# Patient Record
Sex: MALE | Race: WHITE
[De-identification: names, ages, dates, MRNs, and addresses within clinical notes are randomized per-mention and may not be internally consistent; named-entity substitution may affect disease eponyms.]

---

## 2017-08-22 ENCOUNTER — HOSPITAL ENCOUNTER (EMERGENCY)
Dept: HOSPITAL 26 - MED | Age: 47
Discharge: HOME | End: 2017-08-22
Payer: COMMERCIAL

## 2017-08-22 VITALS — DIASTOLIC BLOOD PRESSURE: 79 MMHG | SYSTOLIC BLOOD PRESSURE: 98 MMHG

## 2017-08-22 VITALS — BODY MASS INDEX: 25.71 KG/M2 | WEIGHT: 160 LBS | HEIGHT: 66 IN

## 2017-08-22 VITALS — DIASTOLIC BLOOD PRESSURE: 77 MMHG | SYSTOLIC BLOOD PRESSURE: 129 MMHG

## 2017-08-22 DIAGNOSIS — S20.219A: Primary | ICD-10-CM
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
## 2017-08-22 LAB
ALBUMIN FLD-MCNC: 3.7 G/DL (ref 3.4–5)
ANION GAP SERPL CALCULATED.3IONS-SCNC: 11.7 MMOL/L (ref 8–16)
AST SERPL-CCNC: 18 U/L (ref 15–37)
BASOPHILS # BLD AUTO: 0.6 K/UL (ref 0–0.22)
BASOPHILS NFR BLD AUTO: 0 % (ref 0–2)
BILIRUB SERPL-MCNC: 0.5 MG/DL (ref 0–1)
BUN SERPL-MCNC: 9 MG/DL (ref 7–18)
CHLORIDE SERPL-SCNC: 97 MMOL/L (ref 98–107)
CO2 SERPL-SCNC: 28.1 MMOL/L (ref 21–32)
CREAT SERPL-MCNC: 0.9 MG/DL (ref 0.7–1.3)
EOSINOPHIL # BLD AUTO: 0.1 K/UL (ref 0–0.4)
EOSINOPHIL NFR BLD AUTO: 1 % (ref 0–4)
ERYTHROCYTE [DISTWIDTH] IN BLOOD BY AUTOMATED COUNT: 12.1 % (ref 11.6–13.7)
GFR SERPL CREATININE-BSD FRML MDRD: 116 ML/MIN (ref 90–?)
GLUCOSE SERPL-MCNC: 94 MG/DL (ref 74–106)
HCT VFR BLD AUTO: 39.2 % (ref 36–52)
HGB BLD-MCNC: 13.5 G/DL (ref 12–18)
LYMPHOCYTES # BLD AUTO: 2.4 K/UL (ref 2–11.5)
LYMPHOCYTES NFR BLD AUTO: 31 % (ref 20.5–51.1)
MCH RBC QN AUTO: 34 PG (ref 27–31)
MCHC RBC AUTO-ENTMCNC: 35 G/DL (ref 33–37)
MCV RBC AUTO: 99 FL (ref 80–94)
MONOCYTES # BLD AUTO: 0.5 K/UL (ref 0.8–1)
MONOCYTES NFR BLD AUTO: 9 % (ref 1.7–9.3)
NEUTROPHILS # BLD AUTO: 2.9 K/UL (ref 1.8–7.7)
NEUTROPHILS NFR BLD AUTO: 59 % (ref 42.2–75.2)
PLATELET # BLD AUTO: 98 K/UL (ref 140–450)
POTASSIUM SERPL-SCNC: 3.8 MMOL/L (ref 3.5–5.1)
PROTHROMBIN TIME: 13.3 SECS (ref 10.8–13.4)
RBC # BLD AUTO: 3.98 MIL/UL (ref 4.2–6.1)
SODIUM SERPL-SCNC: 133 MMOL/L (ref 136–145)
WBC # BLD AUTO: 6.5 K/UL (ref 4.8–10.8)

## 2017-08-22 PROCEDURE — 85610 PROTHROMBIN TIME: CPT

## 2017-08-22 PROCEDURE — 93005 ELECTROCARDIOGRAM TRACING: CPT

## 2017-08-22 PROCEDURE — 84484 ASSAY OF TROPONIN QUANT: CPT

## 2017-08-22 PROCEDURE — 85025 COMPLETE CBC W/AUTO DIFF WBC: CPT

## 2017-08-22 PROCEDURE — 80053 COMPREHEN METABOLIC PANEL: CPT

## 2017-08-22 PROCEDURE — 96361 HYDRATE IV INFUSION ADD-ON: CPT

## 2017-08-22 PROCEDURE — 36415 COLL VENOUS BLD VENIPUNCTURE: CPT

## 2017-08-22 PROCEDURE — 83880 ASSAY OF NATRIURETIC PEPTIDE: CPT

## 2017-08-22 PROCEDURE — 99285 EMERGENCY DEPT VISIT HI MDM: CPT

## 2017-08-22 PROCEDURE — 85730 THROMBOPLASTIN TIME PARTIAL: CPT

## 2017-08-22 PROCEDURE — 96360 HYDRATION IV INFUSION INIT: CPT

## 2017-08-22 PROCEDURE — 71010: CPT

## 2017-08-22 NOTE — NUR
Patient discharged with v/s stable. Written and verbal after care instructions 
given and explained. 

Patient alert, oriented and verbalized understanding of instructions. Wheel 
Chair Assisted with by caregiver. All questions addressed prior to discharge. 
ID band removed. Patient advised to follow up with PMD. Rx of IBUPROFEN given. 
Patient educated on indication of medication including possible reaction and 
side effects. Opportunity to ask questions provided and answered.

## 2017-08-22 NOTE — NUR
PATIENT PRESENTS TO ED WITH CHEST PAIN AND HEADACHE TODAY. PT IS FROM [facility] 
[facility], CAREGIVERS PRESENT AT BEDSIDE. DENIES N/V/D; SKIN IS PINK/WARM/DRY; AAOX4 
WITH EVEN AND STEADY GAIT; LUNGS CLEAR BL; HR EVEN AND REGULAR; PT DENIES ANY 
FEVER, CP, SOB, OR COUGH AT THIS TIME; VSS; PATIENT POSITIONED FOR COMFORT; HOB 
ELEVATED; BEDRAILS UP X2; BED DOWN. ER MD MADE AWARE OF PT STATUS.

## 2020-09-01 ENCOUNTER — HOSPITAL ENCOUNTER (EMERGENCY)
Dept: HOSPITAL 26 - MED | Age: 50
Discharge: HOME | End: 2020-09-01
Payer: COMMERCIAL

## 2020-09-01 VITALS — HEIGHT: 66 IN | WEIGHT: 150 LBS | BODY MASS INDEX: 24.11 KG/M2

## 2020-09-01 VITALS — DIASTOLIC BLOOD PRESSURE: 58 MMHG | SYSTOLIC BLOOD PRESSURE: 126 MMHG

## 2020-09-01 VITALS — SYSTOLIC BLOOD PRESSURE: 115 MMHG | DIASTOLIC BLOOD PRESSURE: 65 MMHG

## 2020-09-01 DIAGNOSIS — Z79.899: ICD-10-CM
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
## 2020-09-01 DIAGNOSIS — Z86.73: ICD-10-CM

## 2020-09-01 DIAGNOSIS — S52.501A: Primary | ICD-10-CM
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
## 2020-09-01 DIAGNOSIS — S52.601A: ICD-10-CM
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
## 2020-09-01 PROCEDURE — 25605 CLTX DST RDL FX/EPHYS SEP W/: CPT

## 2020-09-01 PROCEDURE — 99285 EMERGENCY DEPT VISIT HI MDM: CPT

## 2020-09-01 PROCEDURE — 73130 X-RAY EXAM OF HAND: CPT

## 2020-09-01 PROCEDURE — 96374 THER/PROPH/DIAG INJ IV PUSH: CPT

## 2020-09-01 PROCEDURE — 73090 X-RAY EXAM OF FOREARM: CPT

## 2020-09-01 PROCEDURE — 73100 X-RAY EXAM OF WRIST: CPT

## 2020-09-01 NOTE — NUR
Patient discharged with v/s stable. Written and verbal after care instructions 
given and explained. 

Patient alert, oriented and verbalized understanding of instructions. Wheel 
Chair Assisted with by caregiver. All questions addressed prior to discharge. 
ID band removed. Patient advised to follow up with PMD. Rx of tylenol given. 
Patient educated on indication of medication including possible reaction and 
side effects. Opportunity to ask questions provided and answered.

## 2020-09-01 NOTE — NUR
51 YO MALE CO FELL OUT OF HIS WHEELCHAIR X 4 DAYS AGO---STAFF NOTED RIGHT HAND 
SWELLING-----NO OTHER INJURIES NOTED





HX-SEVERE MENTAL DELAY, CONGENITAL HYPOTHYROIDISM

## 2020-09-01 NOTE — NUR
MODERATE SEDATION PERFORMED. RT, EMT AND ERMD AT BEDSIDE DURING PROCEDURE. TIME 
OUT DONE. POST REDUCTION XRAY COMPLETED. CAST APPLIED BY EMT. PLEASE SEE 
MODERATE SEDATION RECORD FOR FURTHER DETAILS.

## 2023-01-04 ENCOUNTER — HOSPITAL ENCOUNTER (INPATIENT)
Dept: HOSPITAL 26 - MED | Age: 53
LOS: 20 days | Discharge: NURSING, REHAB OR LONG-TERM CARE | DRG: 720 | End: 2023-01-24
Attending: STUDENT IN AN ORGANIZED HEALTH CARE EDUCATION/TRAINING PROGRAM | Admitting: STUDENT IN AN ORGANIZED HEALTH CARE EDUCATION/TRAINING PROGRAM
Payer: COMMERCIAL

## 2023-01-04 VITALS — DIASTOLIC BLOOD PRESSURE: 64 MMHG | SYSTOLIC BLOOD PRESSURE: 108 MMHG

## 2023-01-04 VITALS — DIASTOLIC BLOOD PRESSURE: 56 MMHG | SYSTOLIC BLOOD PRESSURE: 92 MMHG

## 2023-01-04 VITALS — DIASTOLIC BLOOD PRESSURE: 71 MMHG | SYSTOLIC BLOOD PRESSURE: 96 MMHG

## 2023-01-04 VITALS — SYSTOLIC BLOOD PRESSURE: 154 MMHG | DIASTOLIC BLOOD PRESSURE: 76 MMHG

## 2023-01-04 VITALS — BODY MASS INDEX: 24.43 KG/M2 | HEIGHT: 66 IN | WEIGHT: 152 LBS

## 2023-01-04 VITALS — SYSTOLIC BLOOD PRESSURE: 78 MMHG | DIASTOLIC BLOOD PRESSURE: 49 MMHG

## 2023-01-04 VITALS — DIASTOLIC BLOOD PRESSURE: 68 MMHG | SYSTOLIC BLOOD PRESSURE: 100 MMHG

## 2023-01-04 VITALS — DIASTOLIC BLOOD PRESSURE: 75 MMHG | SYSTOLIC BLOOD PRESSURE: 122 MMHG

## 2023-01-04 DIAGNOSIS — E86.0: ICD-10-CM

## 2023-01-04 DIAGNOSIS — D69.6: ICD-10-CM

## 2023-01-04 DIAGNOSIS — K59.00: ICD-10-CM

## 2023-01-04 DIAGNOSIS — E87.20: ICD-10-CM

## 2023-01-04 DIAGNOSIS — A04.72: ICD-10-CM

## 2023-01-04 DIAGNOSIS — I50.9: ICD-10-CM

## 2023-01-04 DIAGNOSIS — Z20.822: ICD-10-CM

## 2023-01-04 DIAGNOSIS — G93.40: ICD-10-CM

## 2023-01-04 DIAGNOSIS — N17.9: ICD-10-CM

## 2023-01-04 DIAGNOSIS — F79: ICD-10-CM

## 2023-01-04 DIAGNOSIS — E87.0: ICD-10-CM

## 2023-01-04 DIAGNOSIS — I69.951: ICD-10-CM

## 2023-01-04 DIAGNOSIS — R62.50: ICD-10-CM

## 2023-01-04 DIAGNOSIS — J96.01: ICD-10-CM

## 2023-01-04 DIAGNOSIS — R65.21: ICD-10-CM

## 2023-01-04 DIAGNOSIS — J15.9: ICD-10-CM

## 2023-01-04 DIAGNOSIS — A41.9: Primary | ICD-10-CM

## 2023-01-04 DIAGNOSIS — F41.9: ICD-10-CM

## 2023-01-04 DIAGNOSIS — G40.909: ICD-10-CM

## 2023-01-04 DIAGNOSIS — E87.6: ICD-10-CM

## 2023-01-04 DIAGNOSIS — H10.9: ICD-10-CM

## 2023-01-04 LAB
ALBUMIN FLD-MCNC: 3.2 G/DL (ref 3.4–5)
ANION GAP SERPL CALCULATED.3IONS-SCNC: 19.3 MMOL/L (ref 8–16)
ANION GAP SERPL CALCULATED.3IONS-SCNC: 21.5 MMOL/L (ref 8–16)
APPEARANCE UR: (no result)
AST SERPL-CCNC: 27 U/L (ref 15–37)
BARBITURATES UR QL SCN: NEGATIVE NG/ML
BASOPHILS # BLD AUTO: 0 K/UL (ref 0–0.22)
BASOPHILS NFR BLD AUTO: 0.1 % (ref 0–2)
BENZODIAZ UR QL SCN: NEGATIVE NG/ML
BILIRUB SERPL-MCNC: 0.8 MG/DL (ref 0–1)
BILIRUB UR QL STRIP: (no result)
BUN SERPL-MCNC: 14 MG/DL (ref 7–18)
BUN SERPL-MCNC: 23 MG/DL (ref 7–18)
BZE UR QL SCN: NEGATIVE NG/ML
CANNABINOIDS UR QL SCN: NEGATIVE NG/ML
CHLORIDE SERPL-SCNC: 106 MMOL/L (ref 98–107)
CHLORIDE SERPL-SCNC: 95 MMOL/L (ref 98–107)
CO2 SERPL-SCNC: 15.7 MMOL/L (ref 21–32)
CO2 SERPL-SCNC: 21.9 MMOL/L (ref 21–32)
COLOR UR: (no result)
CREAT SERPL-MCNC: 1.2 MG/DL (ref 0.6–1.3)
CREAT SERPL-MCNC: 1.3 MG/DL (ref 0.6–1.3)
EOSINOPHIL # BLD AUTO: 0 K/UL (ref 0–0.4)
EOSINOPHIL NFR BLD AUTO: 0 % (ref 0–4)
ERYTHROCYTE [DISTWIDTH] IN BLOOD BY AUTOMATED COUNT: 14.5 % (ref 11.6–13.7)
GFR SERPL CREATININE-BSD FRML MDRD: 74 ML/MIN (ref 90–?)
GFR SERPL CREATININE-BSD FRML MDRD: 81 ML/MIN (ref 90–?)
GLUCOSE SERPL-MCNC: 121 MG/DL (ref 74–106)
GLUCOSE SERPL-MCNC: 144 MG/DL (ref 74–106)
GLUCOSE UR STRIP-MCNC: (no result) MG/DL
HCT VFR BLD AUTO: 43.8 % (ref 36–52)
HGB BLD-MCNC: 14.5 G/DL (ref 12–18)
HGB UR QL STRIP: NEGATIVE
LEUKOCYTE ESTERASE UR QL STRIP: (no result)
LYMPHOCYTES # BLD AUTO: 0.3 K/UL (ref 2–11.5)
LYMPHOCYTES NFR BLD AUTO: 2.5 % (ref 20.5–51.1)
MCH RBC QN AUTO: 34 PG (ref 27–31)
MCHC RBC AUTO-ENTMCNC: 33 G/DL (ref 33–37)
MCV RBC AUTO: 103.4 FL (ref 80–94)
MONOCYTES # BLD AUTO: 0.5 K/UL (ref 0.8–1)
MONOCYTES NFR BLD AUTO: 4.3 % (ref 1.7–9.3)
NEUTROPHILS # BLD AUTO: 11.5 K/UL (ref 1.8–7.7)
NEUTROPHILS NFR BLD AUTO: 93.1 % (ref 42.2–75.2)
NITRITE UR QL STRIP: POSITIVE
OPIATES UR QL SCN: NEGATIVE NG/ML
PCP UR QL SCN: NEGATIVE NG/ML
PH UR STRIP: 6.5 [PH] (ref 5–9)
PLATELET # BLD AUTO: 142 K/UL (ref 140–450)
POTASSIUM SERPL-SCNC: 3.4 MMOL/L (ref 3.5–5.1)
POTASSIUM SERPL-SCNC: 4 MMOL/L (ref 3.5–5.1)
PROTHROMBIN TIME: 10.5 SECS (ref 10.8–13.4)
RBC # BLD AUTO: 4.24 MIL/UL (ref 4.2–6.1)
RBC #/AREA URNS HPF: (no result) /HPF (ref 0–5)
SODIUM SERPL-SCNC: 135 MMOL/L (ref 136–145)
SODIUM SERPL-SCNC: 137 MMOL/L (ref 136–145)
WBC # BLD AUTO: 12.3 K/UL (ref 4.8–10.8)
WBC,URINE: (no result) /HPF (ref 0–5)

## 2023-01-04 PROCEDURE — 5A0935A ASSISTANCE WITH RESPIRATORY VENTILATION, LESS THAN 24 CONSECUTIVE HOURS, HIGH NASAL FLOW/VELOCITY: ICD-10-PCS | Performed by: INTERNAL MEDICINE

## 2023-01-04 RX ADMIN — FAMOTIDINE SCH MG: 10 INJECTION INTRAVENOUS at 20:40

## 2023-01-04 RX ADMIN — DIVALPROEX SODIUM SCH MG: 500 TABLET, EXTENDED RELEASE ORAL at 20:44

## 2023-01-04 RX ADMIN — DEXTROSE SCH MLS/HR: 50 INJECTION, SOLUTION INTRAVENOUS at 18:19

## 2023-01-04 RX ADMIN — SODIUM CHLORIDE SCH MLS/HR: 9 INJECTION, SOLUTION INTRAVENOUS at 14:44

## 2023-01-04 RX ADMIN — BENZTROPINE MESYLATE SCH MG: 1 TABLET ORAL at 21:08

## 2023-01-04 RX ADMIN — DOCUSATE SODIUM SCH MG: 100 CAPSULE, LIQUID FILLED ORAL at 21:09

## 2023-01-04 RX ADMIN — MIDODRINE HYDROCHLORIDE SCH MG: 5 TABLET ORAL at 18:19

## 2023-01-04 RX ADMIN — HYDROCODONE BITARTRATE AND ACETAMINOPHEN PRN TAB: 5; 325 TABLET ORAL at 22:51

## 2023-01-04 NOTE — NUR
PATIENT IS IN RESPIRATORY DISTRESS AND DESATURATING S02 DOWN TO 83%; PUT PATIENT OF HIGH 
FLOW 02 BY THE RT; S02 SOMEWHAT IMPROVE BUT PATIENT STILL LOOK LIKE IS DISTRESS AND 
COMPLAINED OF ABDOMINAL PAIN SO MEDICATED WITH HYDROCODONE TAB PO GIVEN.

## 2023-01-04 NOTE — NUR
PATIENT ARRIVED FROM ER, TRANSFERRED SAFELY TO ICU BED. AAOX2, DELAYED SPEECH, INTELLECTUAL 
DISABILITY HISTORY, COOPERATIVE, FOLLOWS COMMANDS. IV SITE INTACT, PATENT, INFUSING IVF AS 
PER MD ORDERS. CASTILLO CATH IN PLACE. ON ROOM AIR. COMPLAINS OF ABDOMINAL PAIN, MILD 2/10, 
WITHIN TOLERABLE AT THIS TIME. ORIENTED PATIENT TO ROOM AND CALL LIGHT. WILL CONTINUE TO 
MONITOR.

## 2023-01-04 NOTE — NUR
PT RECEIVED, CARE ASSUMED. PT AWAKE BUT CONFUSSED. PT UNABLE TO ANSWER 
QUESTIONS. CONNECTED TO TELE MONITOR: . NOTED LOW BP 81/45. PLACED PT ON 
[place]G POSITION, INSERTED 22G IV TO LEFT FA. STARTED 1 LITER NS0.9. 
AWAITING TO BE SEEN BY MD

## 2023-01-04 NOTE — NUR
Patient will be admitted to care of ICU 2. Admited to ICU.  Will go to room. 
Belongings list completed.  Report to .

## 2023-01-04 NOTE — NUR
RECEIVED PATIENT ON BED, AWAKE, ALERT, ABLE TO FOLLOW COMMANDS THOUGH PATIENT  HAS 
INTELLECTUAL DISABILITY. BREATHING EVEN AND UNLABORED; CARDIACSCOPE SHOWS ON SINUS TACHYHR 
125/MIN NO ARRHYTHMIAS SEEN. IVF IN PROGRESS NORMAL SALINE  ML/HR VIA G 22 IV CANNULA 
ON LEFT ARM; PATENT AND INTACT. ABDOMEN IS SOFT, NON TENDER, ACTIVE BOWEL SOUNDS.  WITH 
CASTILLO CATH IN SITU TO GRAVITY DRAINAGE DRAINING TO TEA COLORED URINE OUTPUT .

## 2023-01-05 VITALS — SYSTOLIC BLOOD PRESSURE: 108 MMHG | DIASTOLIC BLOOD PRESSURE: 72 MMHG

## 2023-01-05 VITALS — SYSTOLIC BLOOD PRESSURE: 109 MMHG | DIASTOLIC BLOOD PRESSURE: 52 MMHG

## 2023-01-05 VITALS — SYSTOLIC BLOOD PRESSURE: 119 MMHG | DIASTOLIC BLOOD PRESSURE: 80 MMHG

## 2023-01-05 VITALS — SYSTOLIC BLOOD PRESSURE: 108 MMHG | DIASTOLIC BLOOD PRESSURE: 67 MMHG

## 2023-01-05 VITALS — DIASTOLIC BLOOD PRESSURE: 68 MMHG | SYSTOLIC BLOOD PRESSURE: 123 MMHG

## 2023-01-05 VITALS — SYSTOLIC BLOOD PRESSURE: 122 MMHG | DIASTOLIC BLOOD PRESSURE: 66 MMHG

## 2023-01-05 VITALS — DIASTOLIC BLOOD PRESSURE: 48 MMHG | SYSTOLIC BLOOD PRESSURE: 86 MMHG

## 2023-01-05 VITALS — SYSTOLIC BLOOD PRESSURE: 134 MMHG | DIASTOLIC BLOOD PRESSURE: 54 MMHG

## 2023-01-05 VITALS — SYSTOLIC BLOOD PRESSURE: 111 MMHG | DIASTOLIC BLOOD PRESSURE: 62 MMHG

## 2023-01-05 VITALS — SYSTOLIC BLOOD PRESSURE: 110 MMHG | DIASTOLIC BLOOD PRESSURE: 58 MMHG

## 2023-01-05 VITALS — DIASTOLIC BLOOD PRESSURE: 61 MMHG | SYSTOLIC BLOOD PRESSURE: 98 MMHG

## 2023-01-05 VITALS — SYSTOLIC BLOOD PRESSURE: 96 MMHG | DIASTOLIC BLOOD PRESSURE: 59 MMHG

## 2023-01-05 VITALS — DIASTOLIC BLOOD PRESSURE: 57 MMHG | SYSTOLIC BLOOD PRESSURE: 94 MMHG

## 2023-01-05 VITALS — DIASTOLIC BLOOD PRESSURE: 51 MMHG | SYSTOLIC BLOOD PRESSURE: 98 MMHG

## 2023-01-05 VITALS — SYSTOLIC BLOOD PRESSURE: 98 MMHG | DIASTOLIC BLOOD PRESSURE: 53 MMHG

## 2023-01-05 VITALS — DIASTOLIC BLOOD PRESSURE: 54 MMHG | SYSTOLIC BLOOD PRESSURE: 89 MMHG

## 2023-01-05 VITALS — DIASTOLIC BLOOD PRESSURE: 73 MMHG | SYSTOLIC BLOOD PRESSURE: 116 MMHG

## 2023-01-05 VITALS — DIASTOLIC BLOOD PRESSURE: 54 MMHG | SYSTOLIC BLOOD PRESSURE: 92 MMHG

## 2023-01-05 VITALS — DIASTOLIC BLOOD PRESSURE: 59 MMHG | SYSTOLIC BLOOD PRESSURE: 101 MMHG

## 2023-01-05 VITALS — DIASTOLIC BLOOD PRESSURE: 63 MMHG | SYSTOLIC BLOOD PRESSURE: 97 MMHG

## 2023-01-05 VITALS — SYSTOLIC BLOOD PRESSURE: 94 MMHG | DIASTOLIC BLOOD PRESSURE: 57 MMHG

## 2023-01-05 VITALS — DIASTOLIC BLOOD PRESSURE: 56 MMHG | SYSTOLIC BLOOD PRESSURE: 108 MMHG

## 2023-01-05 VITALS — SYSTOLIC BLOOD PRESSURE: 93 MMHG | DIASTOLIC BLOOD PRESSURE: 58 MMHG

## 2023-01-05 VITALS — DIASTOLIC BLOOD PRESSURE: 66 MMHG | SYSTOLIC BLOOD PRESSURE: 122 MMHG

## 2023-01-05 VITALS — DIASTOLIC BLOOD PRESSURE: 55 MMHG | SYSTOLIC BLOOD PRESSURE: 98 MMHG

## 2023-01-05 VITALS — SYSTOLIC BLOOD PRESSURE: 85 MMHG | DIASTOLIC BLOOD PRESSURE: 44 MMHG

## 2023-01-05 LAB
ALBUMIN FLD-MCNC: 2.8 G/DL (ref 3.4–5)
ANION GAP SERPL CALCULATED.3IONS-SCNC: 21.6 MMOL/L (ref 8–16)
AST SERPL-CCNC: 112 U/L (ref 15–37)
BASOPHILS # BLD AUTO: 0 K/UL (ref 0–0.22)
BASOPHILS NFR BLD AUTO: 0.1 % (ref 0–2)
BILIRUB SERPL-MCNC: 0.7 MG/DL (ref 0–1)
BUN SERPL-MCNC: 26 MG/DL (ref 7–18)
CHLORIDE SERPL-SCNC: 104 MMOL/L (ref 98–107)
CO2 SERPL-SCNC: 21.2 MMOL/L (ref 21–32)
CREAT SERPL-MCNC: 1.5 MG/DL (ref 0.6–1.3)
EOSINOPHIL # BLD AUTO: 0 K/UL (ref 0–0.4)
EOSINOPHIL NFR BLD AUTO: 0 % (ref 0–4)
ERYTHROCYTE [DISTWIDTH] IN BLOOD BY AUTOMATED COUNT: 14.4 % (ref 11.6–13.7)
GFR SERPL CREATININE-BSD FRML MDRD: 63 ML/MIN (ref 90–?)
GLUCOSE SERPL-MCNC: 75 MG/DL (ref 74–106)
HCT VFR BLD AUTO: 38.5 % (ref 36–52)
HGB BLD-MCNC: 12.8 G/DL (ref 12–18)
LYMPHOCYTES # BLD AUTO: 1.1 K/UL (ref 2–11.5)
LYMPHOCYTES NFR BLD AUTO: 9.7 % (ref 20.5–51.1)
MAGNESIUM SERPL-MCNC: 2.9 MG/DL (ref 1.8–2.4)
MCH RBC QN AUTO: 34 PG (ref 27–31)
MCHC RBC AUTO-ENTMCNC: 33 G/DL (ref 33–37)
MCV RBC AUTO: 103.2 FL (ref 80–94)
MONOCYTES # BLD AUTO: 1.3 K/UL (ref 0.8–1)
MONOCYTES NFR BLD AUTO: 12 % (ref 1.7–9.3)
NEUTROPHILS # BLD AUTO: 8.7 K/UL (ref 1.8–7.7)
NEUTROPHILS NFR BLD AUTO: 78.2 % (ref 42.2–75.2)
PLATELET # BLD AUTO: 140 K/UL (ref 140–450)
POTASSIUM SERPL-SCNC: 3.8 MMOL/L (ref 3.5–5.1)
RBC # BLD AUTO: 3.73 MIL/UL (ref 4.2–6.1)
SODIUM SERPL-SCNC: 143 MMOL/L (ref 136–145)
WBC # BLD AUTO: 11.2 K/UL (ref 4.8–10.8)

## 2023-01-05 PROCEDURE — 5A09357 ASSISTANCE WITH RESPIRATORY VENTILATION, LESS THAN 24 CONSECUTIVE HOURS, CONTINUOUS POSITIVE AIRWAY PRESSURE: ICD-10-PCS | Performed by: INTERNAL MEDICINE

## 2023-01-05 RX ADMIN — ENOXAPARIN SODIUM SCH MG: 40 INJECTION SUBCUTANEOUS at 09:38

## 2023-01-05 RX ADMIN — MIDODRINE HYDROCHLORIDE SCH MG: 5 TABLET ORAL at 18:00

## 2023-01-05 RX ADMIN — DIVALPROEX SODIUM SCH MG: 500 TABLET, EXTENDED RELEASE ORAL at 21:00

## 2023-01-05 RX ADMIN — DOCUSATE SODIUM SCH MG: 100 CAPSULE, LIQUID FILLED ORAL at 09:00

## 2023-01-05 RX ADMIN — SODIUM CHLORIDE SCH MLS/HR: 9 INJECTION, SOLUTION INTRAVENOUS at 20:58

## 2023-01-05 RX ADMIN — DIVALPROEX SODIUM SCH MG: 500 TABLET, EXTENDED RELEASE ORAL at 09:00

## 2023-01-05 RX ADMIN — MORPHINE SULFATE PRN MG: 2 INJECTION, SOLUTION INTRAMUSCULAR; INTRAVENOUS at 07:46

## 2023-01-05 RX ADMIN — SENNOSIDES A AND B SCH MG: 8.6 TABLET, FILM COATED ORAL at 09:00

## 2023-01-05 RX ADMIN — NICARDIPINE HYDROCHLORIDE PRN MLS/HR: 25 INJECTION INTRAVENOUS at 06:40

## 2023-01-05 RX ADMIN — BENZTROPINE MESYLATE SCH MG: 1 TABLET ORAL at 09:00

## 2023-01-05 RX ADMIN — DEXTROSE SCH MLS/HR: 50 INJECTION, SOLUTION INTRAVENOUS at 00:51

## 2023-01-05 RX ADMIN — DOCUSATE SODIUM SCH MG: 100 CAPSULE, LIQUID FILLED ORAL at 21:00

## 2023-01-05 RX ADMIN — DEXTROSE SCH MLS/HR: 50 INJECTION, SOLUTION INTRAVENOUS at 19:19

## 2023-01-05 RX ADMIN — MORPHINE SULFATE PRN MG: 2 INJECTION, SOLUTION INTRAMUSCULAR; INTRAVENOUS at 00:55

## 2023-01-05 RX ADMIN — FAMOTIDINE SCH MG: 10 INJECTION INTRAVENOUS at 10:00

## 2023-01-05 RX ADMIN — MIDODRINE HYDROCHLORIDE SCH MG: 5 TABLET ORAL at 00:43

## 2023-01-05 RX ADMIN — POLYETHYLENE GLYCOL 3350 SCH GM: 17 POWDER, FOR SOLUTION ORAL at 09:00

## 2023-01-05 RX ADMIN — DEXTROSE SCH MLS/HR: 50 INJECTION, SOLUTION INTRAVENOUS at 12:41

## 2023-01-05 RX ADMIN — SODIUM CHLORIDE SCH MLS/HR: 9 INJECTION, SOLUTION INTRAVENOUS at 00:30

## 2023-01-05 RX ADMIN — MIDODRINE HYDROCHLORIDE SCH MG: 5 TABLET ORAL at 12:00

## 2023-01-05 RX ADMIN — DEXTROSE SCH MLS/HR: 50 INJECTION, SOLUTION INTRAVENOUS at 06:00

## 2023-01-05 RX ADMIN — MIDODRINE HYDROCHLORIDE SCH MG: 5 TABLET ORAL at 06:00

## 2023-01-05 RX ADMIN — INSULIN HUMAN PRN MLS/HR: 100 INJECTION, SOLUTION PARENTERAL at 15:22

## 2023-01-05 RX ADMIN — BENZTROPINE MESYLATE SCH MG: 1 TABLET ORAL at 21:00

## 2023-01-05 NOTE — NUR
INTUBATION PROCEDURE ERWIN JORDAN RCP AND LUMA NG RCP ATTENDING; USING A GLIDESCOPE VOCAL 
CORDS VISUALIZED PATIENT SUCCESSFULLY INTUBATED BY DR. LIZET AMEZCUA WITH AN ENDOTRACHEAL TUBE 
(ETT) #7.5 SECURED AT 25cm TEETH/GUM LINE WITH AN ANCHOR FAST CUFF PRESSURE MOV ETT 
PLACEMENT CONFIRMED VIA ETCO2-YELLOW GOOD CHEST RISE AUSCULTATION BILATERAL LUNG APEX TO 
BASES GOOD AERATION CXR TO FOLLOW

## 2023-01-05 NOTE — NUR
NEOSYNEPHRINE DRIP STARTED AT 50 MCG/MIN VIA PERIPHERAL LINE TO NEWLY INSERTED G20 IV 
CANNULA ON RIGHT ARM. V/S MONITORED CLOSELY.

## 2023-01-05 NOTE — NUR
ABG RESULTS ON BiPAP WERE REPORTED TO DR. COSME. READ BACK CONFIRMED. NO CHANGES OR ORDER 
GIVEN AT THIS TIME. WILL XONTINUE TO MONITOR PT.

## 2023-01-05 NOTE — NUR
RECEIVED REPORT FROM AM SHIFT. PATIENT WAS SEEN AND ASSESSED. PATIENT IS INTUBATED WITH ETT 
SIZE 7.5 AND SECURED WITH A BITING BLOCK ANCHOR-FAST 25cm @ TEETH. PATIENT IS ON VENTILATOR 
SUPPORT. VENTILATOR PLUGGED IN RED OUTLET. VENTILATOR ALARMS SET APPROPRIATELY AND AUDIBLE 
TO ENVIRONMENT. AMBU BAG AT BEDSIDE. HEAD OF BED GREATER THAN 30 DEGREES. NOTICED ADEQUATE 
BILATERAL CHEST RISE AND FALL. PATIENT IS IN NO RESPIRATORY DISTRESS AT THIS TIME.VENT 
SETTINGS: AC/VC RR 18, Vt 450, PEEP 5, FiO2 100% WITH SPO2 OF 97%.

BILATERAL BREATH SOUNDS ON AUSCULTATION; UPPER LOBES: CLEAR, LOWER LOBES: COARSE. SUCTION 
SMALL AMOUNT OF WHITE/YELLOW THICK SECRETIONS FROM ETT.

## 2023-01-05 NOTE — NUR
2000- PT FOUND INTUBATED AND RESPONSIVE TO VOICE WITH EYE OPEN BUT DOESNT FOLLOW COMMANDS. 
FENATNYL, VERSED, NORMAL SALINE AND NEOSYNEPHRINE RUNNING THROUGH RIGHT UPPER ARM PICC. ALL 
IV SITES FLUSH WITHOUT COMPLICATION. NGT INSERTED ON RIGHT NARE AND TO BE CONFIRMED WITH CT 
ABDOMEN WHICH WAS SCHEDUYLED TO BE AT 2130. NO COMPLICATION NOTED IN THE INSERTION OF THE 
NGT

## 2023-01-05 NOTE — NUR
Call made to Disha High with [employer]  Joni  ID [id number]. Disha 
made aware of pt's condition, obtained consent for PICC line and informed about possibility 
of putting patient on ventilator. PICC line consent signed by Dr. Key.

-------------------------------------------------------------------------------

Addendum: 01/05/23 at 1028 by Agency Roseanne WEISS RN

-------------------------------------------------------------------------------

Witnessed by Chantel WEISS. Topical Sulfur Applications Pregnancy And Lactation Text: This medication is Pregnancy Category C and has an unknown safety profile during pregnancy. It is unknown if this topical medication is excreted in breast milk.

## 2023-01-05 NOTE — NUR
Received report on pt. Pt noted with tachypnea on bipap FiO2 100%. IV sites with IVF 
infusing, no infiltration noted. Pt also on neosynephrine drip. Murillo in place with minimal 
output. Pt able to answer yes/no questions, but unable to tell if is understood.

## 2023-01-05 NOTE — NUR
SPO2 98%. TITRATED FiO2 FROM 100% TO 95%. SPO2 96%. PT TOLERATING WELL AT THIS TIME. WILL 
CONTINUE TO MONITOR PT

## 2023-01-05 NOTE — NUR
DC PLANNING 



PER NOTES, PT IS ALERT AND ORIENTED X2 WITH INTELLECTUAL DISABILITY

SW OUTREACHED TO PTS EMERGENCY CONTACT, ROMERO, [phone] WHO REPORTS PT IS RESIDENT OF 
[facility] FOR 10+YRS. ROMERO REPORTS PT HAS ACTIVE FAMILY INVOLVEMENT, SISTER, EMIL BENAVIDES, [phone] WHO IS PTS [facility] [place]. PT IS ALSO REPORTED TO BE [facility] CONNECTED, 
[facility] SW; RONALDO BREWER, [phone]. PT IS REPORTED TO BE WHEELCHAIR BOUND AND REQUIRES 
ASSISTANCE WITH ADL'S. PT IS REPORTED TO BE MINIMALLY VERBAL AND CAN ANSWER SIMPLE 
QUESTIONS. PT IS REPORTED TO BE Bhutanese SPEAKING ONLY. PT IS REPORTED TO SHOW VERY MINIMAL 
VERBAL AND PHYSICAL AGGRESSION HOWEVER. PT IS REPORTED TO BE COMPLIANT WITH CARE AND TAKES 
MEDICATION THAT IS ADMINISTERED BY HOME.  ROMERO REPORTS DC PLAN IS FOR PT TO RETURN TO 
[facility] WHEN MEDICALLY STABLE. ROMERO REPORTS GROUP HOME PROVIDES 
TRANSPORTATION . 

-------------------------------------------------------------------------------

Addendum: 01/05/23 at 1656 by Magdy HUFF

-------------------------------------------------------------------------------

Amended: Links added.

## 2023-01-05 NOTE — NUR
AT APPROXIMATELY 2140 PT WAS DISCONNECTED AND OXYGENATION AND VENTILATION WAS PROVIDED VIA 
BVM TO CT SCAN. PT TOLERATED WELL DURING PROCEDURE AND TRANSPORT. SPO2 98% %. PT WAS 
BROUGHT BACK AT 2220 AND CONNECTED BACK TO VENT. WILL CONTINUE TO MONITOR PT.

## 2023-01-05 NOTE — NUR
REFERRED TO DR. COSME, PATIENT'S BP IS GETTING LOW ; NOW ITS 87/54 TACHYAPNEIC RR 
35-40/MIN TACHYCARDEIC -134; GOT NEW ORDERS AT 0550HR; ALL ORDERS CARRIED OUT.

## 2023-01-05 NOTE — NUR
1/5/23 RD INITIAL ASSESSMENT COMPLETED



PLEASE REFER TO NUTRITION ASSESSMENT UNDER CARE ACTIVITY FOR ESTIMATED NUTRITIONAL NEEDS. 



1. RECOMMEND CONSIDER NUTRITION SUPPORT IF/WHEN MEDICALLY APPROPRIATE AS TOLERATED 

2. RECOMMEND VITAL AF 1.2 AT GOAL RATE 60 ML/HR,  ML Q6H AS TOLERATED

- PROVIDES 1440 ML TOTAL VOLUME, 1728 KCAL, 108 GM PROTEIN AND 1760 ML FREE WATER DAILY 
MEETING 100% ESTIMATED KCAL AND PROTEIN NEEDS; ADEQUATE

- START TF AT 1O ML/HR INCREASE BY 1O ML Q4H UNTIL GOAL IS REACHED

3. MONITOR NPO STATUS, GI SYMPTOMS, NUTRITION RELATED LAB VALUES

4. CONSULT RD PRN

5. RD TO FOLLOW-UP 2-3 DAYS, HIGH RISK 



REVIEWED BY KIKA AMEZCUA RD

## 2023-01-05 NOTE — NUR
PLACED ON A  VENTILATOR WITH COMPRESSOR PLUGGED INTO RED OUTLET TOLERATING WELL 
WITHOUT ADVERSE REACTIONS NOTED

## 2023-01-05 NOTE — NUR
STAT CHEST X-RAY, BMP, AND ABG REQUESTED AS ORDERED BY DR. COSME AND ALL RESULTS REPORTED 
TO HIM WITH ORDER TO GIVE SODIUM BICARB 3 AMPULES NOW; GIVEN IVP AS ORDERED.

## 2023-01-05 NOTE — NUR
Endorsed plan of care to Sandoval WEISS. Made aware of CT angio with contrast, consent signed and 
placed in chart

## 2023-01-05 NOTE — NUR
Call placed again to [facility] living [facility] to obtain information for contrast 
questionnaire. Consent completed and in chart.

## 2023-01-05 NOTE — NUR
Called Disha Lennox via [employer]  #[phone] and obtained consent for CT angiogram 
ordered by MD, but Disha states she does not know complete history of pt and states to 
call nursing home for more information regarding the questionnaire. Attempted 3 calls to 
pt's caregiver Praveen Krishnamurthy at [facility], no answer and no voicemail 
available to leave message. Will attempt again later.

## 2023-01-05 NOTE — NUR
Call made to contact on facesheet, Praveen Krishnamurthy. Praveen states he is the caregiver 
for patient but is not the major decision maker. Praveen provided number for patient's 
sister Disha High [phone] and [facility]  [phone]. Praveen 
also states he will fax [employer].

## 2023-01-05 NOTE — NUR
Intubation done by Dr. Ray

-------------------------------------------------------------------------------

Addendum: 01/05/23 at 1203 by Agency Roseanne WEISS RN

-------------------------------------------------------------------------------

RT present at bedside. Pt received Etomidate 20 mg and rocuronium 30 mg. Attempted NGT 
insertion.

## 2023-01-05 NOTE — NUR
PATIENT HAS BEEN SCREENED AND CATEGORIZED AS HIGH NUTRITION RISK. PATIENT WILL BE SEEN 
WITHIN 1-2 DAYS OF ADMISSION.



1/5/231/6/23



REVIEWED BY KIKA AMEZCUA RD

## 2023-01-06 VITALS — SYSTOLIC BLOOD PRESSURE: 99 MMHG | DIASTOLIC BLOOD PRESSURE: 53 MMHG

## 2023-01-06 VITALS — SYSTOLIC BLOOD PRESSURE: 114 MMHG | DIASTOLIC BLOOD PRESSURE: 70 MMHG

## 2023-01-06 VITALS — DIASTOLIC BLOOD PRESSURE: 59 MMHG | SYSTOLIC BLOOD PRESSURE: 88 MMHG

## 2023-01-06 VITALS — DIASTOLIC BLOOD PRESSURE: 57 MMHG | SYSTOLIC BLOOD PRESSURE: 92 MMHG

## 2023-01-06 VITALS — SYSTOLIC BLOOD PRESSURE: 120 MMHG | DIASTOLIC BLOOD PRESSURE: 67 MMHG

## 2023-01-06 VITALS — DIASTOLIC BLOOD PRESSURE: 60 MMHG | SYSTOLIC BLOOD PRESSURE: 115 MMHG

## 2023-01-06 VITALS — DIASTOLIC BLOOD PRESSURE: 51 MMHG | SYSTOLIC BLOOD PRESSURE: 91 MMHG

## 2023-01-06 VITALS — DIASTOLIC BLOOD PRESSURE: 67 MMHG | SYSTOLIC BLOOD PRESSURE: 120 MMHG

## 2023-01-06 VITALS — DIASTOLIC BLOOD PRESSURE: 52 MMHG | SYSTOLIC BLOOD PRESSURE: 94 MMHG

## 2023-01-06 VITALS — SYSTOLIC BLOOD PRESSURE: 98 MMHG | DIASTOLIC BLOOD PRESSURE: 50 MMHG

## 2023-01-06 VITALS — SYSTOLIC BLOOD PRESSURE: 130 MMHG | DIASTOLIC BLOOD PRESSURE: 78 MMHG

## 2023-01-06 VITALS — DIASTOLIC BLOOD PRESSURE: 47 MMHG | SYSTOLIC BLOOD PRESSURE: 89 MMHG

## 2023-01-06 VITALS — SYSTOLIC BLOOD PRESSURE: 116 MMHG | DIASTOLIC BLOOD PRESSURE: 73 MMHG

## 2023-01-06 VITALS — DIASTOLIC BLOOD PRESSURE: 75 MMHG | SYSTOLIC BLOOD PRESSURE: 111 MMHG

## 2023-01-06 VITALS — SYSTOLIC BLOOD PRESSURE: 115 MMHG | DIASTOLIC BLOOD PRESSURE: 60 MMHG

## 2023-01-06 VITALS — DIASTOLIC BLOOD PRESSURE: 64 MMHG | SYSTOLIC BLOOD PRESSURE: 93 MMHG

## 2023-01-06 VITALS — DIASTOLIC BLOOD PRESSURE: 59 MMHG | SYSTOLIC BLOOD PRESSURE: 94 MMHG

## 2023-01-06 VITALS — SYSTOLIC BLOOD PRESSURE: 97 MMHG | DIASTOLIC BLOOD PRESSURE: 53 MMHG

## 2023-01-06 VITALS — SYSTOLIC BLOOD PRESSURE: 93 MMHG | DIASTOLIC BLOOD PRESSURE: 43 MMHG

## 2023-01-06 VITALS — SYSTOLIC BLOOD PRESSURE: 116 MMHG | DIASTOLIC BLOOD PRESSURE: 70 MMHG

## 2023-01-06 VITALS — SYSTOLIC BLOOD PRESSURE: 94 MMHG | DIASTOLIC BLOOD PRESSURE: 59 MMHG

## 2023-01-06 VITALS — SYSTOLIC BLOOD PRESSURE: 125 MMHG | DIASTOLIC BLOOD PRESSURE: 57 MMHG

## 2023-01-06 VITALS — SYSTOLIC BLOOD PRESSURE: 94 MMHG | DIASTOLIC BLOOD PRESSURE: 54 MMHG

## 2023-01-06 VITALS — DIASTOLIC BLOOD PRESSURE: 48 MMHG | SYSTOLIC BLOOD PRESSURE: 95 MMHG

## 2023-01-06 VITALS — DIASTOLIC BLOOD PRESSURE: 66 MMHG | SYSTOLIC BLOOD PRESSURE: 121 MMHG

## 2023-01-06 VITALS — DIASTOLIC BLOOD PRESSURE: 55 MMHG | SYSTOLIC BLOOD PRESSURE: 104 MMHG

## 2023-01-06 VITALS — SYSTOLIC BLOOD PRESSURE: 130 MMHG | DIASTOLIC BLOOD PRESSURE: 89 MMHG

## 2023-01-06 VITALS — DIASTOLIC BLOOD PRESSURE: 59 MMHG | SYSTOLIC BLOOD PRESSURE: 104 MMHG

## 2023-01-06 VITALS — SYSTOLIC BLOOD PRESSURE: 93 MMHG | DIASTOLIC BLOOD PRESSURE: 45 MMHG

## 2023-01-06 VITALS — SYSTOLIC BLOOD PRESSURE: 116 MMHG | DIASTOLIC BLOOD PRESSURE: 63 MMHG

## 2023-01-06 LAB
ALBUMIN FLD-MCNC: 2.3 G/DL (ref 3.4–5)
ANION GAP SERPL CALCULATED.3IONS-SCNC: 15.7 MMOL/L (ref 8–16)
AST SERPL-CCNC: 259 U/L (ref 15–37)
BASOPHILS # BLD AUTO: 0 K/UL (ref 0–0.22)
BASOPHILS NFR BLD AUTO: 0.2 % (ref 0–2)
BILIRUB SERPL-MCNC: 0.9 MG/DL (ref 0–1)
BUN SERPL-MCNC: 42 MG/DL (ref 7–18)
CHLORIDE SERPL-SCNC: 107 MMOL/L (ref 98–107)
CO2 SERPL-SCNC: 23.8 MMOL/L (ref 21–32)
CREAT SERPL-MCNC: 1.3 MG/DL (ref 0.6–1.3)
EOSINOPHIL # BLD AUTO: 0 K/UL (ref 0–0.4)
EOSINOPHIL NFR BLD AUTO: 0.2 % (ref 0–4)
ERYTHROCYTE [DISTWIDTH] IN BLOOD BY AUTOMATED COUNT: 14.8 % (ref 11.6–13.7)
GFR SERPL CREATININE-BSD FRML MDRD: 74 ML/MIN (ref 90–?)
GLUCOSE SERPL-MCNC: 64 MG/DL (ref 74–106)
HCT VFR BLD AUTO: 33.9 % (ref 36–52)
HGB BLD-MCNC: 11.5 G/DL (ref 12–18)
LYMPHOCYTES # BLD AUTO: 1 K/UL (ref 2–11.5)
LYMPHOCYTES NFR BLD AUTO: 8.3 % (ref 20.5–51.1)
MAGNESIUM SERPL-MCNC: 3.1 MG/DL (ref 1.8–2.4)
MCH RBC QN AUTO: 35 PG (ref 27–31)
MCHC RBC AUTO-ENTMCNC: 34 G/DL (ref 33–37)
MCV RBC AUTO: 101.6 FL (ref 80–94)
MONOCYTES # BLD AUTO: 1 K/UL (ref 0.8–1)
MONOCYTES NFR BLD AUTO: 8.5 % (ref 1.7–9.3)
NEUTROPHILS # BLD AUTO: 9.7 K/UL (ref 1.8–7.7)
NEUTROPHILS NFR BLD AUTO: 82.8 % (ref 42.2–75.2)
PLATELET # BLD AUTO: 76 K/UL (ref 140–450)
POTASSIUM SERPL-SCNC: 4.5 MMOL/L (ref 3.5–5.1)
RBC # BLD AUTO: 3.33 MIL/UL (ref 4.2–6.1)
SODIUM SERPL-SCNC: 142 MMOL/L (ref 136–145)
WBC # BLD AUTO: 11.8 K/UL (ref 4.8–10.8)

## 2023-01-06 PROCEDURE — 02HV33Z INSERTION OF INFUSION DEVICE INTO SUPERIOR VENA CAVA, PERCUTANEOUS APPROACH: ICD-10-PCS | Performed by: INTERNAL MEDICINE

## 2023-01-06 RX ADMIN — DEXTROSE SCH MLS/HR: 50 INJECTION, SOLUTION INTRAVENOUS at 06:08

## 2023-01-06 RX ADMIN — DIVALPROEX SODIUM SCH MG: 500 TABLET, EXTENDED RELEASE ORAL at 09:00

## 2023-01-06 RX ADMIN — MIDODRINE HYDROCHLORIDE SCH MG: 5 TABLET ORAL at 00:00

## 2023-01-06 RX ADMIN — DEXTROSE SCH MLS/HR: 50 INJECTION, SOLUTION INTRAVENOUS at 23:42

## 2023-01-06 RX ADMIN — MIDODRINE HYDROCHLORIDE SCH MG: 5 TABLET ORAL at 06:00

## 2023-01-06 RX ADMIN — INSULIN HUMAN PRN MLS/HR: 100 INJECTION, SOLUTION PARENTERAL at 10:38

## 2023-01-06 RX ADMIN — MAGNESIUM HYDROXIDE PRN ML: 400 SUSPENSION ORAL at 13:41

## 2023-01-06 RX ADMIN — SENNOSIDES A AND B SCH MG: 8.6 TABLET, FILM COATED ORAL at 09:00

## 2023-01-06 RX ADMIN — ENOXAPARIN SODIUM SCH MG: 40 INJECTION SUBCUTANEOUS at 09:00

## 2023-01-06 RX ADMIN — VALPROIC ACID SCH MG: 250 SOLUTION ORAL at 20:56

## 2023-01-06 RX ADMIN — DEXTROSE SCH MLS/HR: 5 SOLUTION INTRAVENOUS at 09:00

## 2023-01-06 RX ADMIN — POLYETHYLENE GLYCOL 3350 SCH GM: 17 POWDER, FOR SOLUTION ORAL at 09:00

## 2023-01-06 RX ADMIN — DEXTROSE SCH MLS/HR: 50 INJECTION, SOLUTION INTRAVENOUS at 12:24

## 2023-01-06 RX ADMIN — MIDODRINE HYDROCHLORIDE SCH MG: 5 TABLET ORAL at 23:42

## 2023-01-06 RX ADMIN — MIDODRINE HYDROCHLORIDE SCH MG: 5 TABLET ORAL at 12:26

## 2023-01-06 RX ADMIN — SODIUM CHLORIDE SCH MLS/HR: 9 INJECTION, SOLUTION INTRAVENOUS at 15:54

## 2023-01-06 RX ADMIN — BENZTROPINE MESYLATE SCH MG: 1 TABLET ORAL at 09:00

## 2023-01-06 RX ADMIN — NOREPINEPHRINE BITARTRATE SCH MLS/HR: 1 INJECTION INTRAVENOUS at 12:01

## 2023-01-06 RX ADMIN — NOREPINEPHRINE BITARTRATE SCH MLS/HR: 1 INJECTION INTRAVENOUS at 22:09

## 2023-01-06 RX ADMIN — MIDODRINE HYDROCHLORIDE SCH MG: 5 TABLET ORAL at 18:14

## 2023-01-06 RX ADMIN — DEXTROSE SCH MLS/HR: 50 INJECTION, SOLUTION INTRAVENOUS at 18:24

## 2023-01-06 RX ADMIN — BENZTROPINE MESYLATE SCH MG: 1 TABLET ORAL at 20:59

## 2023-01-06 RX ADMIN — DEXTROSE SCH MLS/HR: 50 INJECTION, SOLUTION INTRAVENOUS at 00:03

## 2023-01-06 RX ADMIN — DOCUSATE SODIUM SCH MG: 100 CAPSULE, LIQUID FILLED ORAL at 09:00

## 2023-01-06 NOTE — NUR
PT REMAINS STABLE, INTUBATED AND SEDATED. PRESSORS ARE STILL ON AT NEOSYNEPHRINE AT 50 
MCG/MIN, HAD TO MIX NEOSYNEPHRINE LAST NIGHT DUE TO UNSTOCKED MEDS IN CASSETTE. MIXED 80MG 
NEOSYNEPHRINE IN 1000 ML OF NS DUE TO INSUFFICIENT AMOUNTS  ML BAGS OF NS IN ENTIRE 
HOSPITAL. CHARGE NURSE JEFF MADE AWARE. UNABLE TO FULLY CONFIRM PLACEMENT OF NGT WITH CHEST 
X RAY AND CT IMPRESSIONS DUE TO INABILITY TO ADVANCE DESPITE RECOMMENDATIONS AND THEREFORE 
UNABLE TO GIVE ANY OF THE PO MEDS. WILL ENDORSE TO DAY NURSE TO FOLLOW UP WITH MD FOR 
FURTHER RECOMMENDATIONS ON GASTRIC TUBE PLACEMENT

## 2023-01-06 NOTE — NUR
PT APPEARS TO BE RESTING COMFORTABLY, VERSED AND FENTANYL DRIPS REMAINS OFF. ODIN AND AMIO 
DRIPS INFUSING WELL. REPORT GIVEN TO INCOMING RN ABBY. GEN CONDITION CRITICAL.

## 2023-01-06 NOTE — NUR
ABG DONE, FIO2 TITRATED DOWN TO 40% BY RT. BP AT THIS TIME 94/54, HR 99, ODIN DRIP REMAINS AT 
MAX DOSE. CONT TO MONITOR.

## 2023-01-06 NOTE — NUR
RECEIVED PT. FROM DAY SHIFT ABHISHEK RIVERA. PT. ON ETT PRVC RATE 18, , FIO2 35%, PEEP 5 AND 
O2 SAT 95%. PT. WITH LEFT UPPER ARM PICC LINE, INFUSING NEOSYNEPHRINE 150 MCG/MIN, NS 50 
ML/HR AND AMIODARONE DRIP 0.5 MG/MIN. SINUS TACHYCARDIA 104-107 ON THE MONITOR PT. WITH 
RIGHT NGT CAPPED AND FLUSHED. PER DAY SHIFT, DR. HINDS VERIFIED PLACEMENT OF NGT TO CT AND 
CONFIRMED IN PLACED AND ORDERED TO NO FEEDING UNTIL WITH BM. NO BM REPORTED TODAY. PT. SKIN 
INTACT. CASTILLO CATHETER TO GRAVITY, DRAINING WELL,DARK JAKE URINE. PT. REPOSITIONED. BEDLOCK 
AND PROVIDED SAFE, QUIET ENVIRONMENT. WILL CONT. TO MONITOR.

## 2023-01-06 NOTE — NUR
RECEIVED PTSEDATED, ON VERSED AT 5 MCG/HR AND FENTANYL AT 1.5 MCG/KG/HR; ORALLY INTUBATED, 
AFEBRILE. ASSESSMENT DONE. IV FLUIDS INFUSING WELL VIA LEFT PICC IN LEFT UPPER ARM, GOOD 
BACKFLOW, GEN NONPITTING EDEMA NOTED.  REPOSITIONED TO SIDE, SUPPORTED WITH PILLOWS, CONT TO 
MONITOR, ST /SR.

## 2023-01-06 NOTE — NUR
SPO2 98%. TITRATED FiO2 FROM 95% TO 85%. SPO2 97%. PT TOLERATING WELL AT THIS TIME. RN 
NOTIFIED. WILL CONTINUE TO MONITOR PT

## 2023-01-06 NOTE — NUR
DR. DINORA AMEZCUA HERE UPDATED ON PT CONDITION, INFORMED NGT NEEDS PLACEMENT VERIFICATION, PT AT 
THIS TIME IS TOTALLY NPO. MD VERIFIED PLACEMENT, OK TO USE PER MD, NEW ORDER NOTED AND 
CARRIED OUT.

## 2023-01-06 NOTE — NUR
RECEIVED PT ON ACVC 400,18,+5,85%. VENT WHEELS ARE LOCKED, PLUGGED INTO RED OUTLET, AMBUBAG 
AT BEDSIDE, ALARMS ARE SET AND AUDIBLE.  SATURATION WAS 97%. BREATH SOUNDS WERE CLEAR, BUT 
DIMINISHED IN THE LOWER LOBES. WILL CONTINUE TO MONITOR.

## 2023-01-06 NOTE — NUR
PT. WITH LOW TALISHA SCALE AT HIGH RISK, CONTINUE TO FOLLOW PRESSURE INJURY PREVENTION 
INTERVENTIONS.

-POSITIONING:

TURN AND REPOSITION PATIENT Q 2H OR SOONER

USE PILLOWS TO KEEP BONY PROMINENCES FROM DIRECT CONTACT WITH SURFACES

USE REPOSITIONING WEDGES TO PROVIDE 30-DEGREE ANGLE FOR SIDE LYING POSITIONS

OFFLOADING OR FOAM DRESSING TO ALL TUBING TO PREVENT MEDICAL DEVICES RELATED PRESSURE INJURY

-RE-EVALUATING AND MANAGING INCONTINENCE

MONITOR SKIN CONDITION DURING POSITION CHANGE

DO NOT MASSAGE REDNESS, BONY PROMINENCES

FREQUENT ALEX-CARE AND PROVIDE BARRIER CREAMS PRN IF SOILING

MOISTURE CONTROL BY OFFER BED PAN/URINAL /ABSORBENT PAD TO WICK AND HOLD MOISTURE

KEEP SKIN DRY AND PROTECT FROM FRICTION

-MANAGE FRICTION/SHEAR/MOBILITY

KEEP HOB AT THE LOWEST LEVEL OF ELEVATION NO MORE THAN 30 DEGREE UNLESS OTHERWISE 
CONTRAINDICATED

USE LIFT SHEET OR TRANSFER DEVICE TO MOVE PATIENT AND PREVENT LATERAL SHEER.

PROTECT HEELS, ELBOWS BONY PROMINENCES WITH SKIN BERRIES OR FOAM DRESSING IF EXPOSED TO 
FRICTION

OFFLOAD BILATERAL HEELS BY PLACING PILLOWS UNDER CALVES AT ALL TIMES, UNLESS OTHERWISE 
CONTRAINDICATED

-PRESSURE REDISTRIBUTION SURFACE THERAPY KAYLA ISOFLEX MATTRESS

-NUTRITION:

PLEASE FOLLOW RD RECOMMENDATIONS AND OFFER NUTRITION SUPPLEMENTS IF ORDERED.

PLEASE CONTACT WOUND CARE NURSE FOR ANY QUESTION AND CHANGE OF WOUND CONDITION.

## 2023-01-07 VITALS — DIASTOLIC BLOOD PRESSURE: 75 MMHG | SYSTOLIC BLOOD PRESSURE: 138 MMHG

## 2023-01-07 VITALS — SYSTOLIC BLOOD PRESSURE: 146 MMHG | DIASTOLIC BLOOD PRESSURE: 79 MMHG

## 2023-01-07 VITALS — DIASTOLIC BLOOD PRESSURE: 76 MMHG | SYSTOLIC BLOOD PRESSURE: 137 MMHG

## 2023-01-07 VITALS — DIASTOLIC BLOOD PRESSURE: 83 MMHG | SYSTOLIC BLOOD PRESSURE: 138 MMHG

## 2023-01-07 VITALS — SYSTOLIC BLOOD PRESSURE: 117 MMHG | DIASTOLIC BLOOD PRESSURE: 68 MMHG

## 2023-01-07 VITALS — SYSTOLIC BLOOD PRESSURE: 138 MMHG | DIASTOLIC BLOOD PRESSURE: 76 MMHG

## 2023-01-07 VITALS — SYSTOLIC BLOOD PRESSURE: 143 MMHG | DIASTOLIC BLOOD PRESSURE: 79 MMHG

## 2023-01-07 VITALS — DIASTOLIC BLOOD PRESSURE: 72 MMHG | SYSTOLIC BLOOD PRESSURE: 118 MMHG

## 2023-01-07 VITALS — SYSTOLIC BLOOD PRESSURE: 140 MMHG | DIASTOLIC BLOOD PRESSURE: 76 MMHG

## 2023-01-07 VITALS — SYSTOLIC BLOOD PRESSURE: 145 MMHG | DIASTOLIC BLOOD PRESSURE: 77 MMHG

## 2023-01-07 VITALS — SYSTOLIC BLOOD PRESSURE: 141 MMHG | DIASTOLIC BLOOD PRESSURE: 72 MMHG

## 2023-01-07 VITALS — DIASTOLIC BLOOD PRESSURE: 74 MMHG | SYSTOLIC BLOOD PRESSURE: 140 MMHG

## 2023-01-07 VITALS — DIASTOLIC BLOOD PRESSURE: 83 MMHG | SYSTOLIC BLOOD PRESSURE: 141 MMHG

## 2023-01-07 VITALS — SYSTOLIC BLOOD PRESSURE: 142 MMHG | DIASTOLIC BLOOD PRESSURE: 83 MMHG

## 2023-01-07 VITALS — SYSTOLIC BLOOD PRESSURE: 138 MMHG | DIASTOLIC BLOOD PRESSURE: 70 MMHG

## 2023-01-07 VITALS — DIASTOLIC BLOOD PRESSURE: 80 MMHG | SYSTOLIC BLOOD PRESSURE: 139 MMHG

## 2023-01-07 VITALS — DIASTOLIC BLOOD PRESSURE: 75 MMHG | SYSTOLIC BLOOD PRESSURE: 134 MMHG

## 2023-01-07 VITALS — DIASTOLIC BLOOD PRESSURE: 80 MMHG | SYSTOLIC BLOOD PRESSURE: 140 MMHG

## 2023-01-07 VITALS — DIASTOLIC BLOOD PRESSURE: 77 MMHG | SYSTOLIC BLOOD PRESSURE: 139 MMHG

## 2023-01-07 VITALS — DIASTOLIC BLOOD PRESSURE: 76 MMHG | SYSTOLIC BLOOD PRESSURE: 148 MMHG

## 2023-01-07 VITALS — DIASTOLIC BLOOD PRESSURE: 81 MMHG | SYSTOLIC BLOOD PRESSURE: 148 MMHG

## 2023-01-07 VITALS — DIASTOLIC BLOOD PRESSURE: 88 MMHG | SYSTOLIC BLOOD PRESSURE: 145 MMHG

## 2023-01-07 VITALS — DIASTOLIC BLOOD PRESSURE: 80 MMHG | SYSTOLIC BLOOD PRESSURE: 153 MMHG

## 2023-01-07 VITALS — SYSTOLIC BLOOD PRESSURE: 154 MMHG | DIASTOLIC BLOOD PRESSURE: 79 MMHG

## 2023-01-07 VITALS — SYSTOLIC BLOOD PRESSURE: 139 MMHG | DIASTOLIC BLOOD PRESSURE: 82 MMHG

## 2023-01-07 VITALS — DIASTOLIC BLOOD PRESSURE: 74 MMHG | SYSTOLIC BLOOD PRESSURE: 142 MMHG

## 2023-01-07 VITALS — SYSTOLIC BLOOD PRESSURE: 153 MMHG | DIASTOLIC BLOOD PRESSURE: 80 MMHG

## 2023-01-07 VITALS — SYSTOLIC BLOOD PRESSURE: 132 MMHG | DIASTOLIC BLOOD PRESSURE: 80 MMHG

## 2023-01-07 VITALS — SYSTOLIC BLOOD PRESSURE: 122 MMHG | DIASTOLIC BLOOD PRESSURE: 65 MMHG

## 2023-01-07 VITALS — SYSTOLIC BLOOD PRESSURE: 117 MMHG | DIASTOLIC BLOOD PRESSURE: 67 MMHG

## 2023-01-07 LAB
ALBUMIN FLD-MCNC: 2.1 G/DL (ref 3.4–5)
ANION GAP SERPL CALCULATED.3IONS-SCNC: 10.2 MMOL/L (ref 8–16)
AST SERPL-CCNC: 149 U/L (ref 15–37)
BASOPHILS NFR BLD MANUAL: 0 % (ref 0–2)
BILIRUB SERPL-MCNC: 0.8 MG/DL (ref 0–1)
BUN SERPL-MCNC: 37 MG/DL (ref 7–18)
CHLORIDE SERPL-SCNC: 105 MMOL/L (ref 98–107)
CO2 SERPL-SCNC: 26 MMOL/L (ref 21–32)
CREAT SERPL-MCNC: 0.9 MG/DL (ref 0.6–1.3)
EOSINOPHIL NFR BLD MANUAL: 0 % (ref 0–4)
ERYTHROCYTE [DISTWIDTH] IN BLOOD BY AUTOMATED COUNT: 15.3 % (ref 11.6–13.7)
GFR SERPL CREATININE-BSD FRML MDRD: 114 ML/MIN (ref 90–?)
GLUCOSE SERPL-MCNC: 105 MG/DL (ref 74–106)
HCT VFR BLD AUTO: 33.5 % (ref 36–52)
HGB BLD-MCNC: 11.3 G/DL (ref 12–18)
LYMPHOCYTES NFR BLD MANUAL: 9 % (ref 20–46)
MAGNESIUM SERPL-MCNC: 2.9 MG/DL (ref 1.8–2.4)
MCH RBC QN AUTO: 34 PG (ref 27–31)
MCHC RBC AUTO-ENTMCNC: 34 G/DL (ref 33–37)
MCV RBC AUTO: 101.9 FL (ref 80–94)
MONOCYTES NFR BLD MANUAL: 8 % (ref 5–12)
PLATELET # BLD AUTO: 60 K/UL (ref 140–450)
POTASSIUM SERPL-SCNC: 4.2 MMOL/L (ref 3.5–5.1)
RBC # BLD AUTO: 3.29 MIL/UL (ref 4.2–6.1)
SODIUM SERPL-SCNC: 137 MMOL/L (ref 136–145)
WBC # BLD AUTO: 15.7 K/UL (ref 4.8–10.8)

## 2023-01-07 RX ADMIN — MIDODRINE HYDROCHLORIDE SCH MG: 5 TABLET ORAL at 06:30

## 2023-01-07 RX ADMIN — VALPROIC ACID SCH MG: 250 SOLUTION ORAL at 09:24

## 2023-01-07 RX ADMIN — DEXTROSE SCH MLS/HR: 5 SOLUTION INTRAVENOUS at 02:33

## 2023-01-07 RX ADMIN — DEXTROSE SCH MLS/HR: 50 INJECTION, SOLUTION INTRAVENOUS at 23:29

## 2023-01-07 RX ADMIN — MIDODRINE HYDROCHLORIDE SCH MG: 5 TABLET ORAL at 19:00

## 2023-01-07 RX ADMIN — ENOXAPARIN SODIUM SCH MG: 40 INJECTION SUBCUTANEOUS at 09:00

## 2023-01-07 RX ADMIN — BENZTROPINE MESYLATE SCH MG: 1 TABLET ORAL at 20:37

## 2023-01-07 RX ADMIN — DEXTROSE SCH MLS/HR: 5 SOLUTION INTRAVENOUS at 20:35

## 2023-01-07 RX ADMIN — BENZTROPINE MESYLATE SCH MG: 1 TABLET ORAL at 09:00

## 2023-01-07 RX ADMIN — MAGNESIUM HYDROXIDE PRN ML: 400 SUSPENSION ORAL at 09:27

## 2023-01-07 RX ADMIN — VALPROIC ACID SCH MG: 250 SOLUTION ORAL at 20:35

## 2023-01-07 RX ADMIN — SODIUM CHLORIDE SCH MLS/HR: 9 INJECTION, SOLUTION INTRAVENOUS at 11:54

## 2023-01-07 RX ADMIN — POLYETHYLENE GLYCOL 3350 SCH GM: 17 POWDER, FOR SOLUTION ORAL at 09:29

## 2023-01-07 RX ADMIN — DEXTROSE SCH MLS/HR: 50 INJECTION, SOLUTION INTRAVENOUS at 17:37

## 2023-01-07 RX ADMIN — AMIODARONE HYDROCHLORIDE SCH MG: 200 TABLET ORAL at 20:41

## 2023-01-07 RX ADMIN — DEXTROSE SCH MLS/HR: 50 INJECTION, SOLUTION INTRAVENOUS at 06:30

## 2023-01-07 RX ADMIN — NICARDIPINE HYDROCHLORIDE PRN MLS/HR: 25 INJECTION INTRAVENOUS at 01:50

## 2023-01-07 RX ADMIN — NICARDIPINE HYDROCHLORIDE PRN MLS/HR: 25 INJECTION INTRAVENOUS at 06:48

## 2023-01-07 RX ADMIN — MIDODRINE HYDROCHLORIDE SCH MG: 5 TABLET ORAL at 12:00

## 2023-01-07 RX ADMIN — DEXTROSE SCH MLS/HR: 50 INJECTION, SOLUTION INTRAVENOUS at 11:55

## 2023-01-07 NOTE — NUR
RECEIVED PT ON BED STUPOROUS, ON NEOSYNEPHRINE DRIP AT 50 MCG/MIN; NO SEDATION AT THIS 
TI,E.ETT TO VENT, TOLERATING CURRENT VENT SETING OF AV 18, TIDAL , FIO2 85%, PEEP 5.  
NGT IN PLACE FOR MEDS ONLY.  ASSESSMENT DONE. REPOSITIONED TO SIDE, SUPPORTED WITH PILLOWS. 
F/C DRAINING VERY SCANT DARK CLOUDY JAKE URINE.  KEPT CLEAN AND DRY. AFEBRILE. ST PER 
MONITOR.

## 2023-01-07 NOTE — NUR
RECEIVED PT. FROM DAY SHIFT ABHISHEK RIVERA. PT. OBTUNDED. EYES NO REACTION TO LIGHT AND 
ACCOMODATION.  CONT. ON ETT TO VENT: A/C RATE 18, , FIO2 40%, PEEP 5 AND O2 SAT 96%. 
BILATERAL LUNG SOUNDS DIMINISHED. CARDIAC RHYTHM 103 SINUS TACHYCARDIA. IV TO LEFT UPPER ARM 
PICC LINE PATENT AND INTACT. INFUSING NS AT 50ML/HR. ABDOMINAL SOUNDS HYPOACTIVE. PT. WITH 
RIGHT NGT RUNNING VITAL AF 1.2 AT 20ML/HR AND TOLERATING WELL, WITH MINIMAL GASTRIC 
RESIDUAL. PT. SKIN IS INTACT. CASTILLO CATHETER TO GRAVITY, PATENT, INTACT AND URINE COLOR DARK 
JAKE. PROVIDED SAFE AND QUIET ENVIRONMENT. REPOSITIONED PT. AND PLACED COMFORTABLY. NO 
RESPIRATORY DISTRESS NOTED. NO S/S OF PAIN AT THIS TIME. WILL CONTINUE TO MONITOR.

## 2023-01-07 NOTE — NUR
DR. LAWRENCE HERE, UPDATED ON PT CONDITION. NEW ORDER NOTED AND CARRIED OUT. NEOSYNEPHRINE DRIP 
NOW AT 30 MCG/MIN. CONT TO MONITOR.

## 2023-01-07 NOTE — NUR
RECEIVED PT ON ACVC 18,400,+5,35%. VENT WHEELS ARE LOCKED, PLUGGED INTO RED OUTLET, AMBUBAG 
AT BEDSIDE, ALARMS ARE SET AND AUDIBLE. SATURATION WAS 95%. SLIGHT COARSE BREATH SOUNDS, 
IMPROVED AFTER SUCTION. GOOD CHEST RISE, WILL CONTINUE TO MONITOR.

## 2023-01-08 VITALS — DIASTOLIC BLOOD PRESSURE: 80 MMHG | SYSTOLIC BLOOD PRESSURE: 137 MMHG

## 2023-01-08 VITALS — DIASTOLIC BLOOD PRESSURE: 81 MMHG | SYSTOLIC BLOOD PRESSURE: 142 MMHG

## 2023-01-08 VITALS — SYSTOLIC BLOOD PRESSURE: 156 MMHG | DIASTOLIC BLOOD PRESSURE: 87 MMHG

## 2023-01-08 VITALS — SYSTOLIC BLOOD PRESSURE: 143 MMHG | DIASTOLIC BLOOD PRESSURE: 83 MMHG

## 2023-01-08 VITALS — SYSTOLIC BLOOD PRESSURE: 151 MMHG | DIASTOLIC BLOOD PRESSURE: 88 MMHG

## 2023-01-08 VITALS — DIASTOLIC BLOOD PRESSURE: 91 MMHG | SYSTOLIC BLOOD PRESSURE: 142 MMHG

## 2023-01-08 VITALS — DIASTOLIC BLOOD PRESSURE: 80 MMHG | SYSTOLIC BLOOD PRESSURE: 156 MMHG

## 2023-01-08 VITALS — SYSTOLIC BLOOD PRESSURE: 143 MMHG | DIASTOLIC BLOOD PRESSURE: 87 MMHG

## 2023-01-08 VITALS — SYSTOLIC BLOOD PRESSURE: 149 MMHG | DIASTOLIC BLOOD PRESSURE: 78 MMHG

## 2023-01-08 VITALS — SYSTOLIC BLOOD PRESSURE: 145 MMHG | DIASTOLIC BLOOD PRESSURE: 96 MMHG

## 2023-01-08 VITALS — DIASTOLIC BLOOD PRESSURE: 89 MMHG | SYSTOLIC BLOOD PRESSURE: 151 MMHG

## 2023-01-08 VITALS — DIASTOLIC BLOOD PRESSURE: 79 MMHG | SYSTOLIC BLOOD PRESSURE: 142 MMHG

## 2023-01-08 VITALS — DIASTOLIC BLOOD PRESSURE: 78 MMHG | SYSTOLIC BLOOD PRESSURE: 134 MMHG

## 2023-01-08 VITALS — DIASTOLIC BLOOD PRESSURE: 84 MMHG | SYSTOLIC BLOOD PRESSURE: 134 MMHG

## 2023-01-08 VITALS — DIASTOLIC BLOOD PRESSURE: 79 MMHG | SYSTOLIC BLOOD PRESSURE: 148 MMHG

## 2023-01-08 VITALS — SYSTOLIC BLOOD PRESSURE: 138 MMHG | DIASTOLIC BLOOD PRESSURE: 77 MMHG

## 2023-01-08 VITALS — SYSTOLIC BLOOD PRESSURE: 148 MMHG | DIASTOLIC BLOOD PRESSURE: 84 MMHG

## 2023-01-08 VITALS — DIASTOLIC BLOOD PRESSURE: 88 MMHG | SYSTOLIC BLOOD PRESSURE: 147 MMHG

## 2023-01-08 VITALS — DIASTOLIC BLOOD PRESSURE: 80 MMHG | SYSTOLIC BLOOD PRESSURE: 143 MMHG

## 2023-01-08 VITALS — DIASTOLIC BLOOD PRESSURE: 73 MMHG | SYSTOLIC BLOOD PRESSURE: 155 MMHG

## 2023-01-08 VITALS — DIASTOLIC BLOOD PRESSURE: 90 MMHG | SYSTOLIC BLOOD PRESSURE: 145 MMHG

## 2023-01-08 VITALS — DIASTOLIC BLOOD PRESSURE: 78 MMHG | SYSTOLIC BLOOD PRESSURE: 140 MMHG

## 2023-01-08 VITALS — DIASTOLIC BLOOD PRESSURE: 84 MMHG | SYSTOLIC BLOOD PRESSURE: 139 MMHG

## 2023-01-08 VITALS — SYSTOLIC BLOOD PRESSURE: 145 MMHG | DIASTOLIC BLOOD PRESSURE: 78 MMHG

## 2023-01-08 LAB
ALBUMIN FLD-MCNC: 1.7 G/DL (ref 3.4–5)
ANION GAP SERPL CALCULATED.3IONS-SCNC: 10.2 MMOL/L (ref 8–16)
AST SERPL-CCNC: 87 U/L (ref 15–37)
BASOPHILS # BLD AUTO: 0 K/UL (ref 0–0.22)
BASOPHILS NFR BLD AUTO: 0.1 % (ref 0–2)
BILIRUB SERPL-MCNC: 0.8 MG/DL (ref 0–1)
BUN SERPL-MCNC: 30 MG/DL (ref 7–18)
CHLORIDE SERPL-SCNC: 106 MMOL/L (ref 98–107)
CO2 SERPL-SCNC: 28.3 MMOL/L (ref 21–32)
CREAT SERPL-MCNC: 0.8 MG/DL (ref 0.6–1.3)
EOSINOPHIL # BLD AUTO: 0 K/UL (ref 0–0.4)
EOSINOPHIL NFR BLD AUTO: 0.3 % (ref 0–4)
ERYTHROCYTE [DISTWIDTH] IN BLOOD BY AUTOMATED COUNT: 14.8 % (ref 11.6–13.7)
GFR SERPL CREATININE-BSD FRML MDRD: 130 ML/MIN (ref 90–?)
GLUCOSE SERPL-MCNC: 94 MG/DL (ref 74–106)
HCT VFR BLD AUTO: 31.9 % (ref 36–52)
HGB BLD-MCNC: 10.9 G/DL (ref 12–18)
LYMPHOCYTES # BLD AUTO: 1.3 K/UL (ref 2–11.5)
LYMPHOCYTES NFR BLD AUTO: 10.7 % (ref 20.5–51.1)
MAGNESIUM SERPL-MCNC: 2.7 MG/DL (ref 1.8–2.4)
MCH RBC QN AUTO: 35 PG (ref 27–31)
MCHC RBC AUTO-ENTMCNC: 34 G/DL (ref 33–37)
MCV RBC AUTO: 101.3 FL (ref 80–94)
MONOCYTES # BLD AUTO: 0.8 K/UL (ref 0.8–1)
MONOCYTES NFR BLD AUTO: 6.7 % (ref 1.7–9.3)
NEUTROPHILS # BLD AUTO: 9.6 K/UL (ref 1.8–7.7)
NEUTROPHILS NFR BLD AUTO: 82.2 % (ref 42.2–75.2)
PLATELET # BLD AUTO: 50 K/UL (ref 140–450)
POTASSIUM SERPL-SCNC: 3.5 MMOL/L (ref 3.5–5.1)
RBC # BLD AUTO: 3.15 MIL/UL (ref 4.2–6.1)
SODIUM SERPL-SCNC: 141 MMOL/L (ref 136–145)
WBC # BLD AUTO: 11.7 K/UL (ref 4.8–10.8)

## 2023-01-08 RX ADMIN — DEXTROSE SCH MLS/HR: 50 INJECTION, SOLUTION INTRAVENOUS at 06:12

## 2023-01-08 RX ADMIN — MIDODRINE HYDROCHLORIDE SCH MG: 5 TABLET ORAL at 06:00

## 2023-01-08 RX ADMIN — DEXTROSE SCH MLS/HR: 50 INJECTION, SOLUTION INTRAVENOUS at 11:11

## 2023-01-08 RX ADMIN — POLYETHYLENE GLYCOL 3350 SCH GM: 17 POWDER, FOR SOLUTION ORAL at 08:26

## 2023-01-08 RX ADMIN — MIDODRINE HYDROCHLORIDE SCH MG: 5 TABLET ORAL at 11:21

## 2023-01-08 RX ADMIN — DEXTROSE SCH MLS/HR: 50 INJECTION, SOLUTION INTRAVENOUS at 17:16

## 2023-01-08 RX ADMIN — BENZTROPINE MESYLATE SCH MG: 1 TABLET ORAL at 21:27

## 2023-01-08 RX ADMIN — VALPROIC ACID SCH MG: 250 SOLUTION ORAL at 08:25

## 2023-01-08 RX ADMIN — AMIODARONE HYDROCHLORIDE SCH MG: 200 TABLET ORAL at 08:24

## 2023-01-08 RX ADMIN — MIDODRINE HYDROCHLORIDE SCH MG: 5 TABLET ORAL at 17:30

## 2023-01-08 RX ADMIN — ENOXAPARIN SODIUM SCH MG: 40 INJECTION SUBCUTANEOUS at 08:27

## 2023-01-08 RX ADMIN — VALPROIC ACID SCH MG: 250 SOLUTION ORAL at 21:27

## 2023-01-08 RX ADMIN — AMIODARONE HYDROCHLORIDE SCH MG: 200 TABLET ORAL at 21:27

## 2023-01-08 RX ADMIN — MIDODRINE HYDROCHLORIDE SCH MG: 5 TABLET ORAL at 00:00

## 2023-01-08 RX ADMIN — DEXTROSE SCH MLS/HR: 5 SOLUTION INTRAVENOUS at 14:02

## 2023-01-08 RX ADMIN — BENZTROPINE MESYLATE SCH MG: 1 TABLET ORAL at 08:26

## 2023-01-08 NOTE — NUR
TOLERATING CPAP TRIAL WELL WITHOUT EVIDENCE OF PULMONARY DISTRESS NOTED EQUAL CHEST RISE 
ENDOTRACHEAL SUCTION FOR SMALL THIN YELLOW SECRETIONS AIRWAY PATENT

## 2023-01-08 NOTE — NUR
DR. BRENDA WYNN ROUNDING IN ICU; ON OR ABOUT THIS TIME PER [place]/ICU RN FOREMENTIONED 
MD PLACED PATIENT ON CPAP TRIAL AS NOTED; ELVIE WYNN; CPAP AS TOLERATED TODAY; NO 
ABG REQUIRED; CPAP TRIAL IN AM WITH ABG

## 2023-01-08 NOTE — NUR
RECEIVED ON A  VENTILATOR (AGILITI #0385) PLUGGED INTO RED OUTLET TOLERATING WELL 
WITHOUT ADVERSE REACTIONS NOTED TO AN ENDOTRACHEAL TUBE #7.5 SECURED AT 25cm TEETH/GUM LINE 
WITH AN ANCHOR FAST CUFF PRESSURE CHECKED AS NOTED AMBU BAG AT BEDSIDE STABLE EQUAL CHEST 
RISE ENDOTRACHEAL TUBE SUCTION FOR LARGE THICK YELLOW/GREEN SECRETIONS AIRWAY PATENT

## 2023-01-08 NOTE — NUR
Received pt responsive to pain stimuli. ETT to vent settings, AC VC  PEEP 5 FiO@@45%. 
Sinus rhythm on monitor. NG-tube to right nare infusing Vital 1.2 @50ml/hr with FWF 50ml 
Q8hr. Murillo catheter draining to bedside drainage. PICC line on left upper arm infusing 
NS@50ml/hr. Peripheral IV 20 gauge intact and saline locked on right upper arm. Safety 
precautions in place.

## 2023-01-08 NOTE — NUR
1/8/23 RD FOLLOW UP COMPLETED.

PLEASE REFER TO NUTRITION ASSESSMENT UNDER CARE ACTIVITY FOR ESTIMATED NUTRITIONAL NEEDS. 

1.CONTINUE WITH VITAL AF 1.2 @ 50ML/HR AS PT TOLERATES

  -FWF 50 ML Q8H OR PER MD

2. MONITOR GASTRIC RESIDUALS

3. CONSULT RD PRN

4. RD TO FOLLOW-UP 3-5 DAYS, MODERATE RISK



JOANA HAMMOND RD

## 2023-01-08 NOTE — NUR
RECEIVED PT. FROM DAY SHIFT ABHISHEK MYERS. PT. AROUSABLE TO TOUCH / VOICE AND OPEN EYES IF 
CALLING HIS NAME. ALSO NOW CAN NOD HEAD IF ASKED IF OK. EYES PERRL. BILATERAL LUNG SOUNDS 
DIMINISHED. ABDOMINAL SOUNDS ACTIVE. CONT. ON ETT TO VENT A/C VC RATE 18, , FIO2 40%, 
PEEP 5 AND O2 SAT 94%. SINUS RHYTHM ON THE MONITOR. IV SITE TO LEFT UPPER ARM PICC LINE 
INFUSING NS AT 50ML/HR. WITH RIGHT NGT RUNNING FEEDING VITAL AF 1.2 AT 50 ML/HR. SKIN 
INTACT. CASTILLO CATHETER IN PLACED WITH DARK JAKE COLOR URINE. MAKE ALL NEEDS KNOWN. 
REPOSITIONED AND PROVIDED SAFE AND QUIET ENVIRONMENT. NO S/S OF RESPIRATORY DISTRESS,NO S/S 
OF PAIN. WILL CONT. TO MONITOR

## 2023-01-08 NOTE — NUR
STABLE GOOD CHEST RISE AIRWAY PATENT SATURATION 96% OM FIO2 OF 45% PEEP 5cmH2O TITRATED FIO2 
TO 40%

## 2023-01-09 VITALS — DIASTOLIC BLOOD PRESSURE: 84 MMHG | SYSTOLIC BLOOD PRESSURE: 144 MMHG

## 2023-01-09 VITALS — SYSTOLIC BLOOD PRESSURE: 154 MMHG | DIASTOLIC BLOOD PRESSURE: 84 MMHG

## 2023-01-09 VITALS — SYSTOLIC BLOOD PRESSURE: 159 MMHG | DIASTOLIC BLOOD PRESSURE: 84 MMHG

## 2023-01-09 VITALS — DIASTOLIC BLOOD PRESSURE: 56 MMHG | SYSTOLIC BLOOD PRESSURE: 124 MMHG

## 2023-01-09 VITALS — SYSTOLIC BLOOD PRESSURE: 169 MMHG | DIASTOLIC BLOOD PRESSURE: 87 MMHG

## 2023-01-09 VITALS — SYSTOLIC BLOOD PRESSURE: 134 MMHG | DIASTOLIC BLOOD PRESSURE: 68 MMHG

## 2023-01-09 VITALS — SYSTOLIC BLOOD PRESSURE: 139 MMHG | DIASTOLIC BLOOD PRESSURE: 84 MMHG

## 2023-01-09 VITALS — SYSTOLIC BLOOD PRESSURE: 159 MMHG | DIASTOLIC BLOOD PRESSURE: 78 MMHG

## 2023-01-09 VITALS — DIASTOLIC BLOOD PRESSURE: 76 MMHG | SYSTOLIC BLOOD PRESSURE: 134 MMHG

## 2023-01-09 VITALS — SYSTOLIC BLOOD PRESSURE: 137 MMHG | DIASTOLIC BLOOD PRESSURE: 84 MMHG

## 2023-01-09 VITALS — SYSTOLIC BLOOD PRESSURE: 139 MMHG | DIASTOLIC BLOOD PRESSURE: 85 MMHG

## 2023-01-09 VITALS — DIASTOLIC BLOOD PRESSURE: 71 MMHG | SYSTOLIC BLOOD PRESSURE: 131 MMHG

## 2023-01-09 VITALS — SYSTOLIC BLOOD PRESSURE: 129 MMHG | DIASTOLIC BLOOD PRESSURE: 85 MMHG

## 2023-01-09 LAB
ALBUMIN FLD-MCNC: 1.5 G/DL (ref 3.4–5)
ANION GAP SERPL CALCULATED.3IONS-SCNC: 11.2 MMOL/L (ref 8–16)
AST SERPL-CCNC: 46 U/L (ref 15–37)
BASOPHILS # BLD AUTO: 0 K/UL (ref 0–0.22)
BASOPHILS NFR BLD AUTO: 0.1 % (ref 0–2)
BILIRUB SERPL-MCNC: 0.7 MG/DL (ref 0–1)
BUN SERPL-MCNC: 20 MG/DL (ref 7–18)
CHLORIDE SERPL-SCNC: 107 MMOL/L (ref 98–107)
CO2 SERPL-SCNC: 28 MMOL/L (ref 21–32)
CREAT SERPL-MCNC: 0.7 MG/DL (ref 0.6–1.3)
EOSINOPHIL # BLD AUTO: 0 K/UL (ref 0–0.4)
EOSINOPHIL NFR BLD AUTO: 0.3 % (ref 0–4)
ERYTHROCYTE [DISTWIDTH] IN BLOOD BY AUTOMATED COUNT: 14.8 % (ref 11.6–13.7)
GFR SERPL CREATININE-BSD FRML MDRD: 152 ML/MIN (ref 90–?)
GLUCOSE SERPL-MCNC: 83 MG/DL (ref 74–106)
HCT VFR BLD AUTO: 31.7 % (ref 36–52)
HGB BLD-MCNC: 10.9 G/DL (ref 12–18)
LYMPHOCYTES # BLD AUTO: 1.6 K/UL (ref 2–11.5)
LYMPHOCYTES NFR BLD AUTO: 16.4 % (ref 20.5–51.1)
MAGNESIUM SERPL-MCNC: 2.3 MG/DL (ref 1.8–2.4)
MCH RBC QN AUTO: 35 PG (ref 27–31)
MCHC RBC AUTO-ENTMCNC: 35 G/DL (ref 33–37)
MCV RBC AUTO: 100.6 FL (ref 80–94)
MONOCYTES # BLD AUTO: 2 K/UL (ref 0.8–1)
MONOCYTES NFR BLD AUTO: 21.1 % (ref 1.7–9.3)
NEUTROPHILS # BLD AUTO: 5.9 K/UL (ref 1.8–7.7)
NEUTROPHILS NFR BLD AUTO: 62.1 % (ref 42.2–75.2)
PLATELET # BLD AUTO: 34 K/UL (ref 140–450)
POTASSIUM SERPL-SCNC: 3.2 MMOL/L (ref 3.5–5.1)
RBC # BLD AUTO: 3.15 MIL/UL (ref 4.2–6.1)
SODIUM SERPL-SCNC: 143 MMOL/L (ref 136–145)
WBC # BLD AUTO: 9.5 K/UL (ref 4.8–10.8)

## 2023-01-09 RX ADMIN — MIDODRINE HYDROCHLORIDE SCH MG: 5 TABLET ORAL at 17:43

## 2023-01-09 RX ADMIN — MIDODRINE HYDROCHLORIDE SCH MG: 5 TABLET ORAL at 23:50

## 2023-01-09 RX ADMIN — MIDODRINE HYDROCHLORIDE SCH MG: 5 TABLET ORAL at 00:00

## 2023-01-09 RX ADMIN — DEXTROSE SCH MLS/HR: 50 INJECTION, SOLUTION INTRAVENOUS at 11:48

## 2023-01-09 RX ADMIN — AMIODARONE HYDROCHLORIDE SCH MG: 200 TABLET ORAL at 21:38

## 2023-01-09 RX ADMIN — BENZTROPINE MESYLATE SCH MG: 1 TABLET ORAL at 21:39

## 2023-01-09 RX ADMIN — VALPROIC ACID SCH MG: 250 SOLUTION ORAL at 08:43

## 2023-01-09 RX ADMIN — ENOXAPARIN SODIUM SCH MG: 40 INJECTION SUBCUTANEOUS at 08:40

## 2023-01-09 RX ADMIN — DEXTROSE SCH MLS/HR: 50 INJECTION, SOLUTION INTRAVENOUS at 06:29

## 2023-01-09 RX ADMIN — POTASSIUM CHLORIDE PRN MEQ: 40 SOLUTION ORAL at 09:42

## 2023-01-09 RX ADMIN — MIDODRINE HYDROCHLORIDE SCH MG: 5 TABLET ORAL at 06:00

## 2023-01-09 RX ADMIN — DEXTROSE SCH MLS/HR: 50 INJECTION, SOLUTION INTRAVENOUS at 23:46

## 2023-01-09 RX ADMIN — VALPROIC ACID SCH MG: 250 SOLUTION ORAL at 21:39

## 2023-01-09 RX ADMIN — AMIODARONE HYDROCHLORIDE SCH MG: 200 TABLET ORAL at 08:42

## 2023-01-09 RX ADMIN — DEXTROSE SCH MLS/HR: 5 SOLUTION INTRAVENOUS at 09:34

## 2023-01-09 RX ADMIN — DEXTROSE SCH MLS/HR: 50 INJECTION, SOLUTION INTRAVENOUS at 00:15

## 2023-01-09 RX ADMIN — POLYETHYLENE GLYCOL 3350 SCH GM: 17 POWDER, FOR SOLUTION ORAL at 08:43

## 2023-01-09 RX ADMIN — SODIUM CHLORIDE SCH MLS/HR: 9 INJECTION, SOLUTION INTRAVENOUS at 04:00

## 2023-01-09 RX ADMIN — MIDODRINE HYDROCHLORIDE SCH MG: 5 TABLET ORAL at 12:00

## 2023-01-09 RX ADMIN — DEXTROSE SCH MLS/HR: 5 SOLUTION INTRAVENOUS at 21:38

## 2023-01-09 RX ADMIN — SODIUM CHLORIDE SCH MLS/HR: 9 INJECTION, SOLUTION INTRAVENOUS at 23:33

## 2023-01-09 RX ADMIN — BENZTROPINE MESYLATE SCH MG: 1 TABLET ORAL at 08:44

## 2023-01-09 RX ADMIN — DEXTROSE SCH MLS/HR: 50 INJECTION, SOLUTION INTRAVENOUS at 17:43

## 2023-01-09 NOTE — NUR
CALLED DR. BRENDA WYNN AT [facility] [phone] TO REVIEW CPAP 
TRIAL ABG RESULTS DWAYNE/EXCHANGE TO PAGE THE FOREMENTIONED MD PATIENT INFORMATION AND CALL 
BACK NUMBER GIVEN

## 2023-01-09 NOTE — NUR
STABLE TOLERATING CPAP TRIAL WITHOUT DISTRESS NOTED EQUAL CHEST RISE ENDOTRACHEAL SUCTION 
FOR SMALL THIN YELLOW SECRETIONS AIRWAY PATENT

## 2023-01-09 NOTE — NUR
Dr. Murillo at bedside and placed pt on CPAP. Reported that pt tolerated CPAP well 
yesterday for 6 hours. New order for ABG at 1030 and call with results. RT Dueñas made aware.

## 2023-01-09 NOTE — NUR
CALL BACK FROM DR. BRENDA WYNN REVIEWED CPAP TRIAL X 2HOURS PEEP 5cmH2O PS [id number]; ABG 
RESULTS; LOC AWAKE ALERT RESPONSIVENESS; SpVt/SpRR MD STATED "THAT'S FINE" ROSALINDA WYNN: EXTUBATE PATIENT; NOTIFIED MD OF ADMINISTRATIONS REQUEST TO KEEP PATIENT INTUBATED 
UNTIL 1230pm (4 DAYS); MD EVEN THOUGH BAFFLED STATED "THAT'S FINE - OKAY)

-------------------------------------------------------------------------------

Addendum: 01/09/23 at 1155 by Spenser Stubbs RT

-------------------------------------------------------------------------------

REQUEST AT BEDSIDE BY LISETH/ABHISHEK

## 2023-01-09 NOTE — NUR
RECEIVED PT. FROM DAY SHIFT RNLOUIS. PT. WIDE AWAKE, ABLE TO TALK WHEN ASKING, BY SAYING 
OKAY, FOLLOW COMMANDS. ON FACEMASK 100%, O2 SAT 90%. BILATERAL LUNGS CRACKLES. MODERATE 
SPUTUM CLEAR YELLOW COLOR. EYES PERRL. SINUS TACHYCARDIA  ON THE MONITOR. IV PICC LINE 
TO LEFT UPPER ARM PATENT AND INTACT, INFUSING NS 50ML/HR. ABDOMINAL SOUNDS ACTIVE. IV TO 
RIGHT UPPER ARM 20G CAPPED AND FLUSHED. WITH RIGHT NGT INFUSING VITAL AF 1.2 AT 50 ML/HR, 
TOLERATING WELL, MINIMAL RESIDUAL. SKIN INTACT. CASTILLO CATHETER TO GRAVITY, COLOR DARK JAKE. 
REPOSITIONED AND PROVIDED SAFE AND QUIET ENVIRONMENT. NO S/S OF RESPIRATORY DISTRESS. NO S/S 
OF PAIN. WILL CONT. TO MONITOR.

## 2023-01-09 NOTE — NUR
Notified Dr. Murillo that pt was extubated today. New order for ST gretaal. If pt passes, okay 
to discontinue NG-tube.

## 2023-01-09 NOTE — NUR
COURTESY CALL TO DR. BRENDA WYNN AT [facility] [phone] 
ANNE MARIE/EXCHANGE TO FORWARD THE FOLLOWING MESSAGE WITH NO RETURN CALL REQUIRED: EXTUBATION AT 
1308; CA AT 40%; SATURATION 95%; STABLE PATIENT INFORMATION AND RETURN PHONE NUMBER GIVEN

## 2023-01-09 NOTE — NUR
Received pt with eyes closed, but able to respond to voice by nodding and gestures. ETT to 
vent settings AC VC  rate 18 PEEP 5 and FiO2@40%. Sinus rhythm on monitor. NG-tube to 
right nare infusing Vital 1.2 @50ml/hr with FWF 50ml Q8hr. Murillo catheter intact and 
draining to BSD. PICC line on left upper arm intact and infusing NS@50ml/hr. Peripheral IV 
20 gauge on right upper arm saline locked. Safety precautions in place.

## 2023-01-10 VITALS — SYSTOLIC BLOOD PRESSURE: 116 MMHG | DIASTOLIC BLOOD PRESSURE: 55 MMHG

## 2023-01-10 VITALS — DIASTOLIC BLOOD PRESSURE: 78 MMHG | SYSTOLIC BLOOD PRESSURE: 138 MMHG

## 2023-01-10 VITALS — SYSTOLIC BLOOD PRESSURE: 102 MMHG | DIASTOLIC BLOOD PRESSURE: 69 MMHG

## 2023-01-10 VITALS — DIASTOLIC BLOOD PRESSURE: 82 MMHG | SYSTOLIC BLOOD PRESSURE: 145 MMHG

## 2023-01-10 VITALS — DIASTOLIC BLOOD PRESSURE: 76 MMHG | SYSTOLIC BLOOD PRESSURE: 131 MMHG

## 2023-01-10 VITALS — SYSTOLIC BLOOD PRESSURE: 139 MMHG | DIASTOLIC BLOOD PRESSURE: 79 MMHG

## 2023-01-10 VITALS — DIASTOLIC BLOOD PRESSURE: 70 MMHG | SYSTOLIC BLOOD PRESSURE: 121 MMHG

## 2023-01-10 VITALS — SYSTOLIC BLOOD PRESSURE: 123 MMHG | DIASTOLIC BLOOD PRESSURE: 38 MMHG

## 2023-01-10 VITALS — DIASTOLIC BLOOD PRESSURE: 67 MMHG | SYSTOLIC BLOOD PRESSURE: 121 MMHG

## 2023-01-10 VITALS — DIASTOLIC BLOOD PRESSURE: 76 MMHG | SYSTOLIC BLOOD PRESSURE: 150 MMHG

## 2023-01-10 VITALS — DIASTOLIC BLOOD PRESSURE: 72 MMHG | SYSTOLIC BLOOD PRESSURE: 126 MMHG

## 2023-01-10 VITALS — DIASTOLIC BLOOD PRESSURE: 78 MMHG | SYSTOLIC BLOOD PRESSURE: 137 MMHG

## 2023-01-10 VITALS — DIASTOLIC BLOOD PRESSURE: 83 MMHG | SYSTOLIC BLOOD PRESSURE: 134 MMHG

## 2023-01-10 LAB
ANION GAP SERPL CALCULATED.3IONS-SCNC: 12 MMOL/L (ref 8–16)
BUN SERPL-MCNC: 14 MG/DL (ref 7–18)
CHLORIDE SERPL-SCNC: 109 MMOL/L (ref 98–107)
CO2 SERPL-SCNC: 26 MMOL/L (ref 21–32)
CREAT SERPL-MCNC: 0.5 MG/DL (ref 0.6–1.3)
ERYTHROCYTE [DISTWIDTH] IN BLOOD BY AUTOMATED COUNT: 14.6 % (ref 11.6–13.7)
GFR SERPL CREATININE-BSD FRML MDRD: 224 ML/MIN (ref 90–?)
GLUCOSE SERPL-MCNC: 80 MG/DL (ref 74–106)
HCT VFR BLD AUTO: 31.4 % (ref 36–52)
HGB BLD-MCNC: 10.9 G/DL (ref 12–18)
LYMPHOCYTES NFR BLD MANUAL: 14 % (ref 20–46)
MCH RBC QN AUTO: 35 PG (ref 27–31)
MCHC RBC AUTO-ENTMCNC: 35 G/DL (ref 33–37)
MCV RBC AUTO: 99.6 FL (ref 80–94)
MONOCYTES NFR BLD MANUAL: 10 % (ref 5–12)
PLATELET # BLD AUTO: 38 K/UL (ref 140–450)
POTASSIUM SERPL-SCNC: 3 MMOL/L (ref 3.5–5.1)
RBC # BLD AUTO: 3.16 MIL/UL (ref 4.2–6.1)
SODIUM SERPL-SCNC: 144 MMOL/L (ref 136–145)
WBC # BLD AUTO: 13.9 K/UL (ref 4.8–10.8)

## 2023-01-10 RX ADMIN — AMIODARONE HYDROCHLORIDE SCH MG: 200 TABLET ORAL at 21:09

## 2023-01-10 RX ADMIN — DEXTROSE SCH MLS/HR: 5 SOLUTION INTRAVENOUS at 09:42

## 2023-01-10 RX ADMIN — VALPROIC ACID SCH MG: 250 SOLUTION ORAL at 08:57

## 2023-01-10 RX ADMIN — POLYETHYLENE GLYCOL 3350 SCH GM: 17 POWDER, FOR SOLUTION ORAL at 09:00

## 2023-01-10 RX ADMIN — POTASSIUM CHLORIDE PRN MLS/HR: 200 INJECTION, SOLUTION INTRAVENOUS at 08:59

## 2023-01-10 RX ADMIN — DEXTROSE SCH MLS/HR: 50 INJECTION, SOLUTION INTRAVENOUS at 17:01

## 2023-01-10 RX ADMIN — BENZTROPINE MESYLATE SCH MG: 1 TABLET ORAL at 21:10

## 2023-01-10 RX ADMIN — ACETAMINOPHEN PRN MG: 325 TABLET ORAL at 06:10

## 2023-01-10 RX ADMIN — MIDODRINE HYDROCHLORIDE SCH MG: 5 TABLET ORAL at 11:32

## 2023-01-10 RX ADMIN — MIDODRINE HYDROCHLORIDE SCH MG: 5 TABLET ORAL at 17:30

## 2023-01-10 RX ADMIN — AMIODARONE HYDROCHLORIDE SCH MG: 200 TABLET ORAL at 08:58

## 2023-01-10 RX ADMIN — DEXTROSE SCH MLS/HR: 50 INJECTION, SOLUTION INTRAVENOUS at 11:24

## 2023-01-10 RX ADMIN — ENOXAPARIN SODIUM SCH MG: 40 INJECTION SUBCUTANEOUS at 09:00

## 2023-01-10 RX ADMIN — MIDODRINE HYDROCHLORIDE SCH MG: 5 TABLET ORAL at 06:00

## 2023-01-10 RX ADMIN — SODIUM CHLORIDE SCH MLS/HR: 9 INJECTION, SOLUTION INTRAVENOUS at 19:54

## 2023-01-10 RX ADMIN — BENZTROPINE MESYLATE SCH MG: 1 TABLET ORAL at 08:58

## 2023-01-10 RX ADMIN — DEXTROSE SCH MLS/HR: 5 SOLUTION INTRAVENOUS at 21:08

## 2023-01-10 RX ADMIN — VALPROIC ACID SCH MG: 250 SOLUTION ORAL at 21:09

## 2023-01-10 RX ADMIN — DEXTROSE SCH MLS/HR: 50 INJECTION, SOLUTION INTRAVENOUS at 06:10

## 2023-01-10 NOTE — NUR
NGTUBE TO RIGHT NARE DISCONTINUED AS PER MD ORDERED. PT TOLERATED WELL. NO BLEEDING OR SIGNS 
OF INJURY.

## 2023-01-10 NOTE — NUR
RECEIVED PT. FROM DAY SHIFT ABHISHEK MYERS. PT. WIDE AWAKE, ALERT AND ORIENTED X1 ( CAN SAY OKAY 
IF ASKED HOW IS HE DOING). HE FOLLOWS SIMPLE COMMANDS. EYES PERRL. BILATERAL LUNGS CRACKLES, 
WITH MODERATE CREAMY THICK SECRETIONS. CONT. ON OXIMIZER  30L/MIN AND FIO2 100%. CARDIAC 
MONITOR SINUS RHYTHM. IV TO LEFT UPPER ARM PICC LINE, PATENT AND INTACT INFUSING NS AT 
50ML/HR. IV TO RIGHT UPPER ARM PERIPHERAL LINE CAPPED AND FLUSHED. PT. ON PUREE THICK LIQUID 
DIET. PER DAY SHIFT, PT. PASSED THE SWALLOW EVALUATION, BUT WHEN FED 02 SAT GOES DOWN TO 85% 
-89%. CASTILLO CATHETER TO GRAVITY WITH DARK JAKE COLOR URINE. REPOSITIONED AND PROVIDED SAFE, 
QUIET ENVIRONMENT. SKIN INTACT. MAKE ALL NEEDS KNOWN. NO S/S OF PAIN. WILL CONT. TO MONITOR.

## 2023-01-10 NOTE — NUR
PT PLACED ON HFNC 30L/100% FOR INCREASED WOB AND OXYGEN LEVELS DROPPING. PT SUZAN WELL. RN 
NOTIFIED

## 2023-01-10 NOTE — NUR
RT titrated O2 down to 13 L via oxymizer. 

-------------------------------------------------------------------------------

Addendum: 01/10/23 at 0800 by Mary Ann Valverde RN

-------------------------------------------------------------------------------

O2 sat 90%.

## 2023-01-10 NOTE — NUR
ST cummins done at bedside and per ST, syeda cummins. ST recommends puree diet with nectar 
thickened liquid. 

-------------------------------------------------------------------------------

Addendum: 01/10/23 at 1418 by Mary Ann Valverde RN

-------------------------------------------------------------------------------

Dr. Alvarez made aware. New orders received.

## 2023-01-10 NOTE — NUR
Received pt responsive to name with periods of confusion. On oxymizer at 15L FiO2@100%. 
Sinus rhythm on monitor. NG-tube on right nare intact and infusing Vital 1.2@50ml/hr with 
FWF 50 Q8hr. Murillo catheter intact and draining to bedside drainage. Picc line on left upper 
arm intact and infusing NS@50ml/hr. Peripheral IV 20 gauge on right upper arm saline locked. 
Safety precautions in place.

## 2023-01-11 VITALS — SYSTOLIC BLOOD PRESSURE: 133 MMHG | DIASTOLIC BLOOD PRESSURE: 69 MMHG

## 2023-01-11 VITALS — DIASTOLIC BLOOD PRESSURE: 78 MMHG | SYSTOLIC BLOOD PRESSURE: 135 MMHG

## 2023-01-11 VITALS — DIASTOLIC BLOOD PRESSURE: 66 MMHG | SYSTOLIC BLOOD PRESSURE: 141 MMHG

## 2023-01-11 VITALS — DIASTOLIC BLOOD PRESSURE: 57 MMHG | SYSTOLIC BLOOD PRESSURE: 124 MMHG

## 2023-01-11 VITALS — DIASTOLIC BLOOD PRESSURE: 62 MMHG | SYSTOLIC BLOOD PRESSURE: 105 MMHG

## 2023-01-11 VITALS — SYSTOLIC BLOOD PRESSURE: 104 MMHG | DIASTOLIC BLOOD PRESSURE: 79 MMHG

## 2023-01-11 VITALS — SYSTOLIC BLOOD PRESSURE: 115 MMHG | DIASTOLIC BLOOD PRESSURE: 64 MMHG

## 2023-01-11 VITALS — SYSTOLIC BLOOD PRESSURE: 129 MMHG | DIASTOLIC BLOOD PRESSURE: 69 MMHG

## 2023-01-11 VITALS — SYSTOLIC BLOOD PRESSURE: 146 MMHG | DIASTOLIC BLOOD PRESSURE: 81 MMHG

## 2023-01-11 VITALS — SYSTOLIC BLOOD PRESSURE: 110 MMHG | DIASTOLIC BLOOD PRESSURE: 48 MMHG

## 2023-01-11 VITALS — DIASTOLIC BLOOD PRESSURE: 55 MMHG | SYSTOLIC BLOOD PRESSURE: 98 MMHG

## 2023-01-11 VITALS — SYSTOLIC BLOOD PRESSURE: 118 MMHG | DIASTOLIC BLOOD PRESSURE: 61 MMHG

## 2023-01-11 VITALS — DIASTOLIC BLOOD PRESSURE: 83 MMHG | SYSTOLIC BLOOD PRESSURE: 144 MMHG

## 2023-01-11 VITALS — SYSTOLIC BLOOD PRESSURE: 148 MMHG | DIASTOLIC BLOOD PRESSURE: 68 MMHG

## 2023-01-11 VITALS — DIASTOLIC BLOOD PRESSURE: 39 MMHG | SYSTOLIC BLOOD PRESSURE: 132 MMHG

## 2023-01-11 VITALS — DIASTOLIC BLOOD PRESSURE: 73 MMHG | SYSTOLIC BLOOD PRESSURE: 150 MMHG

## 2023-01-11 LAB
ANION GAP SERPL CALCULATED.3IONS-SCNC: 10.8 MMOL/L (ref 8–16)
BASOPHILS # BLD AUTO: 0.1 K/UL (ref 0–0.22)
BASOPHILS NFR BLD AUTO: 0.5 % (ref 0–2)
BUN SERPL-MCNC: 17 MG/DL (ref 7–18)
CHLORIDE SERPL-SCNC: 108 MMOL/L (ref 98–107)
CO2 SERPL-SCNC: 28.9 MMOL/L (ref 21–32)
CREAT SERPL-MCNC: 0.6 MG/DL (ref 0.6–1.3)
EOSINOPHIL # BLD AUTO: 0 K/UL (ref 0–0.4)
EOSINOPHIL NFR BLD AUTO: 0.2 % (ref 0–4)
ERYTHROCYTE [DISTWIDTH] IN BLOOD BY AUTOMATED COUNT: 14.4 % (ref 11.6–13.7)
GFR SERPL CREATININE-BSD FRML MDRD: 181 ML/MIN (ref 90–?)
GLUCOSE SERPL-MCNC: 100 MG/DL (ref 74–106)
HCT VFR BLD AUTO: 28.5 % (ref 36–52)
HGB BLD-MCNC: 9.9 G/DL (ref 12–18)
LYMPHOCYTES # BLD AUTO: 1 K/UL (ref 2–11.5)
LYMPHOCYTES NFR BLD AUTO: 7.2 % (ref 20.5–51.1)
MCH RBC QN AUTO: 35 PG (ref 27–31)
MCHC RBC AUTO-ENTMCNC: 35 G/DL (ref 33–37)
MCV RBC AUTO: 99.4 FL (ref 80–94)
MONOCYTES # BLD AUTO: 1.4 K/UL (ref 0.8–1)
MONOCYTES NFR BLD AUTO: 10.2 % (ref 1.7–9.3)
NEUTROPHILS # BLD AUTO: 11.3 K/UL (ref 1.8–7.7)
NEUTROPHILS NFR BLD AUTO: 81.9 % (ref 42.2–75.2)
PLATELET # BLD AUTO: 44 K/UL (ref 140–450)
POTASSIUM SERPL-SCNC: 2.7 MMOL/L (ref 3.5–5.1)
RBC # BLD AUTO: 2.87 MIL/UL (ref 4.2–6.1)
SODIUM SERPL-SCNC: 145 MMOL/L (ref 136–145)
WBC # BLD AUTO: 13.8 K/UL (ref 4.8–10.8)

## 2023-01-11 PROCEDURE — 0BH17EZ INSERTION OF ENDOTRACHEAL AIRWAY INTO TRACHEA, VIA NATURAL OR ARTIFICIAL OPENING: ICD-10-PCS | Performed by: INTERNAL MEDICINE

## 2023-01-11 PROCEDURE — 5A1945Z RESPIRATORY VENTILATION, 24-96 CONSECUTIVE HOURS: ICD-10-PCS | Performed by: INTERNAL MEDICINE

## 2023-01-11 PROCEDURE — 5A0935A ASSISTANCE WITH RESPIRATORY VENTILATION, LESS THAN 24 CONSECUTIVE HOURS, HIGH NASAL FLOW/VELOCITY: ICD-10-PCS | Performed by: INTERNAL MEDICINE

## 2023-01-11 RX ADMIN — DEXTROSE SCH MLS/HR: 50 INJECTION, SOLUTION INTRAVENOUS at 06:49

## 2023-01-11 RX ADMIN — DEXTROSE SCH MLS/HR: 5 SOLUTION INTRAVENOUS at 21:09

## 2023-01-11 RX ADMIN — MIDODRINE HYDROCHLORIDE SCH MG: 5 TABLET ORAL at 06:00

## 2023-01-11 RX ADMIN — FUROSEMIDE SCH MG: 10 INJECTION, SOLUTION INTRAMUSCULAR; INTRAVENOUS at 21:09

## 2023-01-11 RX ADMIN — ENOXAPARIN SODIUM SCH MG: 40 INJECTION SUBCUTANEOUS at 08:52

## 2023-01-11 RX ADMIN — VALPROIC ACID SCH MG: 250 SOLUTION ORAL at 21:00

## 2023-01-11 RX ADMIN — VALPROIC ACID SCH MG: 250 SOLUTION ORAL at 08:27

## 2023-01-11 RX ADMIN — MIDODRINE HYDROCHLORIDE SCH MG: 5 TABLET ORAL at 18:00

## 2023-01-11 RX ADMIN — MIDODRINE HYDROCHLORIDE SCH MG: 5 TABLET ORAL at 11:53

## 2023-01-11 RX ADMIN — DEXTROSE SCH MLS/HR: 5 SOLUTION INTRAVENOUS at 08:28

## 2023-01-11 RX ADMIN — MIDODRINE HYDROCHLORIDE SCH MG: 5 TABLET ORAL at 00:00

## 2023-01-11 RX ADMIN — POTASSIUM CHLORIDE PRN MLS/HR: 200 INJECTION, SOLUTION INTRAVENOUS at 07:33

## 2023-01-11 RX ADMIN — FUROSEMIDE SCH MG: 10 INJECTION, SOLUTION INTRAMUSCULAR; INTRAVENOUS at 10:15

## 2023-01-11 RX ADMIN — DEXTROSE SCH MLS/HR: 50 INJECTION, SOLUTION INTRAVENOUS at 00:00

## 2023-01-11 RX ADMIN — BENZTROPINE MESYLATE SCH MG: 1 TABLET ORAL at 21:00

## 2023-01-11 RX ADMIN — SODIUM CHLORIDE SCH MLS/HR: 9 INJECTION, SOLUTION INTRAVENOUS at 15:54

## 2023-01-11 RX ADMIN — DEXTROSE SCH MLS/HR: 50 INJECTION, SOLUTION INTRAVENOUS at 11:49

## 2023-01-11 RX ADMIN — BENZTROPINE MESYLATE SCH MG: 1 TABLET ORAL at 08:26

## 2023-01-11 RX ADMIN — AMIODARONE HYDROCHLORIDE SCH MG: 200 TABLET ORAL at 08:27

## 2023-01-11 RX ADMIN — DEXTROSE SCH MLS/HR: 50 INJECTION, SOLUTION INTRAVENOUS at 17:52

## 2023-01-11 RX ADMIN — AMIODARONE HYDROCHLORIDE SCH MG: 200 TABLET ORAL at 21:00

## 2023-01-11 NOTE — NUR
DR. WYNN TEXTED BACK AND STATED TOMORROW MORNING EITHER PT. WILL BE PLACED NGT OR HE 
WILL INTUBATE THE PT. WILL ENDORSE TO THE NEXT SHIFT.

## 2023-01-11 NOTE — NUR
TEXTED DR. WYNN AND REPORTED THAT PT. PO MEDICATIONS SCHEDULE FOR 2100 NOT GIVEN DUE TO 
PT. TOO WEAK TO SWALLOW. ON BIPAP RATE 10, 1-12, E-6, FIO2 65%, 02 SAT 96%.

## 2023-01-11 NOTE — NUR
0815 scehduled medication for Potassium Chloride 40meQ not given due to PRN order of 
Potassium Chloride 40meQ given at 0733 already. Per Dr. Hebert order, give 40meq now and 
another 40 in 4 hours.

## 2023-01-11 NOTE — NUR
Endorsed to night shift nurse Chayo for continuity of care and endorsed message from 
pharmacist to check Vanco trough before giving Vanco medication. If Vanco dose is less than 
20, give Vanco. If Vanco greater than 20, hold Vanco and do not give.

## 2023-01-11 NOTE — NUR
PER  PT TO BE PLACED ON BIPAP. SETTINGS 12/6,R10 AND FIO2 80%. ALARMS ON AND 
FUNCTIONING. WILL CONTINUE TO MONITOR.

## 2023-01-11 NOTE — NUR
RECEIVED CALL FROM LAB TO REPORT CRITICAL K LEVEL OF 2.7. NOTIFIED DR. BLACKMON. NEW ORDERS 
RECEIVED.

## 2023-01-11 NOTE — NUR
RECEIVED PT. FROM DAY SHIFT RNLOUIS. PT. APPEARS WEAK. ON BIPAP RATE 10, 1-12, E-6, FIO2 
65% AND O2 SAT 96%. BILATERAL LUNGS SOUNDS RONCHI. EYES PERRL. SINUS RHYTHM ON THE MONITOR. 
IV TO LEFT UPPER ARM PICC LINE PATENT AND INTACT AND RUNNING NS AT 50ML/HR. PT. NPO EXCEPT 
MEDS AS PER DR. WYNN. SKIN INTACT. GENERALIZED WEAKNESS AND RIGHT SIDED WEAKNESS CVA. 
WITH CASTILLO CATHETER TO GRAVITY, PATENT AND INTACT. URINE COLOR DARK JAKE. REPOSITIONED, 
BEDLOCK AND BED PLACED IN THE LOWEST HEIGHT. MAKE ALL NEEDS KNOWN. NO S/S OF PAIN. WILL 
CONT. TO MONITOR.

## 2023-01-11 NOTE — NUR
Received pt awake alert and oriented x1. On O2 via high flow nasal cannula 30L FiO2 90%. 
Sinus rhythm on monitor. Abd with active bowel sounds. Murillo catheter intact and draining to 
bedside drainage. Picc line on left upper arm intact and infusing NS@50ml/hr. Peripheral IV 
20 gauge on right upper arm saline locked. Safety precautions in place.

## 2023-01-11 NOTE — NUR
PT. PO MEDICATIONS SCHEDULE FOR 2100 WILL NOT BE GIVEN. PT. TOO WEAK TO SWALLOW. CONT. ON 
BIPAP RATE 10, FIO2 65% AND 02 SAT 96%. WILL NOTIFY MD.

## 2023-01-12 VITALS — SYSTOLIC BLOOD PRESSURE: 111 MMHG | DIASTOLIC BLOOD PRESSURE: 67 MMHG

## 2023-01-12 VITALS — DIASTOLIC BLOOD PRESSURE: 83 MMHG | SYSTOLIC BLOOD PRESSURE: 142 MMHG

## 2023-01-12 VITALS — DIASTOLIC BLOOD PRESSURE: 53 MMHG | SYSTOLIC BLOOD PRESSURE: 117 MMHG

## 2023-01-12 VITALS — DIASTOLIC BLOOD PRESSURE: 65 MMHG | SYSTOLIC BLOOD PRESSURE: 114 MMHG

## 2023-01-12 VITALS — SYSTOLIC BLOOD PRESSURE: 103 MMHG | DIASTOLIC BLOOD PRESSURE: 60 MMHG

## 2023-01-12 VITALS — DIASTOLIC BLOOD PRESSURE: 55 MMHG | SYSTOLIC BLOOD PRESSURE: 99 MMHG

## 2023-01-12 VITALS — SYSTOLIC BLOOD PRESSURE: 113 MMHG | DIASTOLIC BLOOD PRESSURE: 68 MMHG

## 2023-01-12 VITALS — SYSTOLIC BLOOD PRESSURE: 132 MMHG | DIASTOLIC BLOOD PRESSURE: 55 MMHG

## 2023-01-12 VITALS — SYSTOLIC BLOOD PRESSURE: 109 MMHG | DIASTOLIC BLOOD PRESSURE: 59 MMHG

## 2023-01-12 VITALS — SYSTOLIC BLOOD PRESSURE: 137 MMHG | DIASTOLIC BLOOD PRESSURE: 62 MMHG

## 2023-01-12 VITALS — DIASTOLIC BLOOD PRESSURE: 54 MMHG | SYSTOLIC BLOOD PRESSURE: 100 MMHG

## 2023-01-12 VITALS — DIASTOLIC BLOOD PRESSURE: 78 MMHG | SYSTOLIC BLOOD PRESSURE: 151 MMHG

## 2023-01-12 VITALS — SYSTOLIC BLOOD PRESSURE: 97 MMHG | DIASTOLIC BLOOD PRESSURE: 55 MMHG

## 2023-01-12 VITALS — SYSTOLIC BLOOD PRESSURE: 114 MMHG | DIASTOLIC BLOOD PRESSURE: 57 MMHG

## 2023-01-12 VITALS — DIASTOLIC BLOOD PRESSURE: 57 MMHG | SYSTOLIC BLOOD PRESSURE: 94 MMHG

## 2023-01-12 VITALS — SYSTOLIC BLOOD PRESSURE: 130 MMHG | DIASTOLIC BLOOD PRESSURE: 80 MMHG

## 2023-01-12 VITALS — SYSTOLIC BLOOD PRESSURE: 114 MMHG | DIASTOLIC BLOOD PRESSURE: 69 MMHG

## 2023-01-12 VITALS — DIASTOLIC BLOOD PRESSURE: 71 MMHG | SYSTOLIC BLOOD PRESSURE: 127 MMHG

## 2023-01-12 VITALS — DIASTOLIC BLOOD PRESSURE: 59 MMHG | SYSTOLIC BLOOD PRESSURE: 103 MMHG

## 2023-01-12 VITALS — SYSTOLIC BLOOD PRESSURE: 96 MMHG | DIASTOLIC BLOOD PRESSURE: 54 MMHG

## 2023-01-12 VITALS — DIASTOLIC BLOOD PRESSURE: 55 MMHG | SYSTOLIC BLOOD PRESSURE: 98 MMHG

## 2023-01-12 VITALS — DIASTOLIC BLOOD PRESSURE: 88 MMHG | SYSTOLIC BLOOD PRESSURE: 133 MMHG

## 2023-01-12 LAB
ANION GAP SERPL CALCULATED.3IONS-SCNC: 9.9 MMOL/L (ref 8–16)
BASOPHILS # BLD AUTO: 0.1 K/UL (ref 0–0.22)
BASOPHILS NFR BLD AUTO: 0.5 % (ref 0–2)
BUN SERPL-MCNC: 17 MG/DL (ref 7–18)
CHLORIDE SERPL-SCNC: 108 MMOL/L (ref 98–107)
CO2 SERPL-SCNC: 31 MMOL/L (ref 21–32)
CREAT SERPL-MCNC: 0.6 MG/DL (ref 0.6–1.3)
EOSINOPHIL # BLD AUTO: 0.1 K/UL (ref 0–0.4)
EOSINOPHIL NFR BLD AUTO: 0.8 % (ref 0–4)
ERYTHROCYTE [DISTWIDTH] IN BLOOD BY AUTOMATED COUNT: 14.6 % (ref 11.6–13.7)
GFR SERPL CREATININE-BSD FRML MDRD: 181 ML/MIN (ref 90–?)
GLUCOSE SERPL-MCNC: 81 MG/DL (ref 74–106)
HCT VFR BLD AUTO: 29.4 % (ref 36–52)
HGB BLD-MCNC: 10 G/DL (ref 12–18)
LYMPHOCYTES # BLD AUTO: 1.5 K/UL (ref 2–11.5)
LYMPHOCYTES NFR BLD AUTO: 11.4 % (ref 20.5–51.1)
MCH RBC QN AUTO: 34 PG (ref 27–31)
MCHC RBC AUTO-ENTMCNC: 34 G/DL (ref 33–37)
MCV RBC AUTO: 100.1 FL (ref 80–94)
MONOCYTES # BLD AUTO: 1.2 K/UL (ref 0.8–1)
MONOCYTES NFR BLD AUTO: 9.6 % (ref 1.7–9.3)
NEUTROPHILS # BLD AUTO: 10 K/UL (ref 1.8–7.7)
NEUTROPHILS NFR BLD AUTO: 77.7 % (ref 42.2–75.2)
PLATELET # BLD AUTO: 66 K/UL (ref 140–450)
POTASSIUM SERPL-SCNC: 2.9 MMOL/L (ref 3.5–5.1)
RBC # BLD AUTO: 2.94 MIL/UL (ref 4.2–6.1)
SODIUM SERPL-SCNC: 146 MMOL/L (ref 136–145)
WBC # BLD AUTO: 12.8 K/UL (ref 4.8–10.8)

## 2023-01-12 PROCEDURE — 5A1955Z RESPIRATORY VENTILATION, GREATER THAN 96 CONSECUTIVE HOURS: ICD-10-PCS | Performed by: INTERNAL MEDICINE

## 2023-01-12 RX ADMIN — MIDODRINE HYDROCHLORIDE SCH MG: 5 TABLET ORAL at 11:45

## 2023-01-12 RX ADMIN — FUROSEMIDE SCH MG: 10 INJECTION, SOLUTION INTRAMUSCULAR; INTRAVENOUS at 21:03

## 2023-01-12 RX ADMIN — VALPROIC ACID SCH MG: 250 SOLUTION ORAL at 21:03

## 2023-01-12 RX ADMIN — DEXTROSE SCH MLS/HR: 50 INJECTION, SOLUTION INTRAVENOUS at 18:04

## 2023-01-12 RX ADMIN — MIDODRINE HYDROCHLORIDE SCH MG: 5 TABLET ORAL at 18:04

## 2023-01-12 RX ADMIN — MIDODRINE HYDROCHLORIDE SCH MG: 5 TABLET ORAL at 00:00

## 2023-01-12 RX ADMIN — HYDROCODONE BITARTRATE AND ACETAMINOPHEN PRN TAB: 5; 325 TABLET ORAL at 11:15

## 2023-01-12 RX ADMIN — DEXTROSE SCH MLS/HR: 50 INJECTION, SOLUTION INTRAVENOUS at 05:59

## 2023-01-12 RX ADMIN — VALPROIC ACID SCH MG: 250 SOLUTION ORAL at 11:00

## 2023-01-12 RX ADMIN — AMIODARONE HYDROCHLORIDE SCH MG: 200 TABLET ORAL at 21:04

## 2023-01-12 RX ADMIN — MIDODRINE HYDROCHLORIDE SCH MG: 5 TABLET ORAL at 06:00

## 2023-01-12 RX ADMIN — AMIODARONE HYDROCHLORIDE SCH MG: 200 TABLET ORAL at 11:15

## 2023-01-12 RX ADMIN — HYDROCODONE BITARTRATE AND ACETAMINOPHEN PRN TAB: 5; 325 TABLET ORAL at 18:06

## 2023-01-12 RX ADMIN — BENZTROPINE MESYLATE SCH MG: 1 TABLET ORAL at 21:05

## 2023-01-12 RX ADMIN — MORPHINE SULFATE PRN MG: 2 INJECTION, SOLUTION INTRAMUSCULAR; INTRAVENOUS at 00:35

## 2023-01-12 RX ADMIN — DEXTROSE SCH MLS/HR: 50 INJECTION, SOLUTION INTRAVENOUS at 00:39

## 2023-01-12 RX ADMIN — DEXTROSE SCH MLS/HR: 5 SOLUTION INTRAVENOUS at 08:16

## 2023-01-12 RX ADMIN — BENZTROPINE MESYLATE SCH MG: 1 TABLET ORAL at 11:20

## 2023-01-12 RX ADMIN — DEXTROSE SCH MLS/HR: 5 SOLUTION INTRAVENOUS at 21:02

## 2023-01-12 RX ADMIN — POTASSIUM CHLORIDE PRN MLS/HR: 200 INJECTION, SOLUTION INTRAVENOUS at 06:45

## 2023-01-12 RX ADMIN — ENOXAPARIN SODIUM SCH MG: 40 INJECTION SUBCUTANEOUS at 07:53

## 2023-01-12 RX ADMIN — SODIUM CHLORIDE SCH MLS/HR: 9 INJECTION, SOLUTION INTRAVENOUS at 11:54

## 2023-01-12 RX ADMIN — DEXTROSE SCH MLS/HR: 50 INJECTION, SOLUTION INTRAVENOUS at 11:45

## 2023-01-12 RX ADMIN — FUROSEMIDE SCH MG: 10 INJECTION, SOLUTION INTRAMUSCULAR; INTRAVENOUS at 08:20

## 2023-01-12 NOTE — NUR
PT PLACED ON VENT. SETTINGS AC 18, , PEEP 5 AND FIO2 60%.VENT IS PLUGGED INTO A RED 
OUTLET WITH ALARMS ON AND FUNCTIONING. WILL CONTINUE TO MONITOR.

## 2023-01-12 NOTE — NUR
LAB CALLED FOR K LEVEL OF 2.9. WILL BE GIVEN KCL 20MEQ IVPB PRN AND WILL NOTIFY MD. WILL 
ENDORSE TO THE NEXT SHIFT.

## 2023-01-12 NOTE — NUR
INTUBATED



PATIENT STILL TACHYPNEIC AT 38-40s, INTUBATED BY DR. WYNN, AFTER ETOMIDATE AND 
ROCURONIUM GIVE WITH SIZE 8, 25 CM. AT THE TEETH, AC/VC 60%, +5, 400 MLS, f18. OGT INSERTED, 
PRN ATIVAN FOR AGITATION, AND PRN PAIN MEDICATION. TUBE FEEDING AS ORDERED AS BEFORE. CXR 
ORDERED.

## 2023-01-12 NOTE — NUR
PT INTUBATED WITH A 8.0 ETT SECURED @25 TEETH/GUM. ETT PLACEMENT CONFIRMED WITH CO2 COLOR 
CHANGE AND BILATERAL BREATH SOUNDS. CXR TO BE ORDERED.

## 2023-01-12 NOTE — NUR
RECEIVED PATIENT FROM NIGHT ABHISHEK MORA. PT.BiPAP MASK 12/6, RATE 10, FiO2 45%. HAD MORPHINE 
2 MG AT 0035AM. RR 37-40/MINUTE. ON PUREE NECTAR-THICKENED DIET, NPO DUE TO LEVEL OF 
CONSCIOUSNESS. IVF NS 50 MLS/HR. CASTILLO CATHETER. K+ 2.9, REPLACING WITH 40 mEq. PLATELET 44 
TODAY.

## 2023-01-12 NOTE — NUR
PHONE CALL TO PTS NOK,SISTER SUSY BENAVIDES, RE; PT REINTUBATED, PER AM NURSE CHEPE, THEY 
HAVE NOT NOTIFIED THE FAMILY.NO ANSWER AND VOICEMAIL HAS NOT BEEN SET UP.

## 2023-01-12 NOTE — NUR
RECEIVED REPORT FROM CHEPE WEISS FOR CONTINUITY OF CARE. PT IS RECEIVED LYING SUPINE WITH A 
ETT TO VENT WITH SETTING  AC/VC FIO2 45%, tv 400, rate 18, and peep 5. his lung sound are 
clear , oral care given. he has a ogt in place infusing vital a r at 50cc/hour snd free 
water 50cc q8 hour. pt  has no fever. temperature 97.6 his vital signs are wnl . he hs a 
lim cath draining adequate amts of clear pale urine. he had a bm reported for today. he 
has  a branden picc-line for iv access with ns at 50cc. pt has no sedation per doctors orders. 
he has no overt distress noted.

## 2023-01-12 NOTE — NUR
DC PLANNING:

PATIENT INTUBATED SEDATED FIO2 100% ON PROPOFOL , FENTANYL AND AMIODARONE DRIP, AND IV ABX 
VANCOMYCIN PULMO FOLLOWING . CM TO FOLLOW 

-------------------------------------------------------------------------------

Addendum: 01/18/23 at 1710 by Marianela Landaverde RN

-------------------------------------------------------------------------------

DC PLANNING:

STILL INTUBATED SEDATED FAILED CPAP TRIAL NEW ORDER TO TRANSFER [facility] TO [facility]. SPOKE WITH PT'S 
SISTER AGREED WITH [facility]. PER KIMO NO BED AVAILABLE IN ALL [facility] AND [facility]. CM TO FOLLOW 

-------------------------------------------------------------------------------

Addendum: 01/20/23 at 0912 by Marianela Landaverde RN

-------------------------------------------------------------------------------

DC PLANNING:

STILL INTUBATED SEDATED, ON PRECEDEX DRIP. FIO2 21%  PEEP OF 5. CONTINUED IV ABX ZOSYN . MIRZA MALIN AT [facility] NO BED AVAILABLE. DISCUSSED WITH ATTENDING DR GARVIN WILL SUGGEST PULMO TO 
CONSIDER TRACH/PEG AT [facility].  CM TO FOLLOW 

-------------------------------------------------------------------------------

Addendum: 01/23/23 at 1126 by Marianela Landaverde RN

-------------------------------------------------------------------------------

DC PLANNING:

RECEIVED A MESSAGE FROM DR KEATING THAT IF ITS OK TO PERFORM TRACH AND PEG HERE AT [facility] THAT 
[facility]. SPOKE WITH DR ZURITA ([facility]) STATED OK TO DO THE TRACH HER WAIT 3 DAYS AND PATIENT CAN 
BE SEND TO [facility] FOR FURTHER CARE. SCHEDULED FOR TRACH AND PEG TODAY AT 2 PM BY DR KEATING.   
CM TO FOLLOW 

-------------------------------------------------------------------------------

Addendum: 01/24/23 at 1613 by Marianela Landaverde RN

-------------------------------------------------------------------------------

DC PLANNING:

PATIENT GOT ACCEPTED AT [facility] ACCEPTING DR WYNN. GOING TO ROOM ICU3 # TO GIVE 
REPORT   ARRANGED TRANSPORT WITH [facility]  TIME WITH IN 1 HR. NOTFIED ICU 
NURSE AND DANIELLE CHARGE NURSE. CM TO FOLLOW

## 2023-01-12 NOTE — NUR
1/12/23 RD FOLLOW UP COMPLETED



PLEASE REFER TO NUTRITION ASSESSMENT UNDER CARE ACTIVITY FOR ESTIMATED NUTRITIONAL NEEDS. 



1. RECOMMEND INCREASING VITAL AF 1.2 TO GOAL RATE 60 ML/HR,  ML Q6H AS TOLERATED

2. RECOMMEND ADDING PROSOURCE BID FOR LOW TALISHA (PROVIDES 120 KCAL AND 30 GM PROTEIN DAILY)

- WITH PROSOURCE BID, PROVIDES 1440 ML TOTAL VOLUME, 1848 KCAL, 138 GM PROTEIN AND 1760 ML 
FREE WATER DAILY MEETING 96% ESTIMATED KCAL NEEDS AND >100% ESTIMATED PROTEIN NEEDS; 
ADEQUATE

- START TF AT 1O ML/HR INCREASE BY 1O ML Q4H UNTIL GOAL IS REACHED AS TOLERATED

2. MONITOR GI SYMPTOMS, GASTRIC RESIDUALS AND NUTRITION RELATED LAB VALUES

3. CONSULT RD PRN

4. RD TO FOLLOW-UP 2-3 DAYS, HIGH RISK



REVIEWED BY KIKA AMEZCUA RD

## 2023-01-12 NOTE — NUR
REPORT GIVEN TO NEVA MEJIA. PT. ORALLY INTUBATED AC/VC 45%. PT. ABLE TO NOD WHEN ASKED 
ABOUT PAIN, GIVEN NORCO, HAS ANXIETY GIVEN ATIVAN. DR. WYNN DOES NOT WANT TO SEDATE THE 
PATIENT, SINCE THE PATIENT IS CALM. PT. DOES NOT PULL ANY TUBE OR LINES, NO RESTRAINTS. 
MOVED BOWELS ONCE, LARGE AMOUNT. STARTED ON TUBE FEEDING THIS EVENING. [facility] CALLED UP FOR 
ASSESSMENT OF PLACEMENT. NO ORDER FOR TRACHE. 



LEFT UPPER ARM PICC LINE DRESSING CHANGED, PICC RN ASHOK, WROTE A '2 CM. OUT' ON THE PICC AS 
WITNESSED BY ANOTHER RN CONY. NO SWELLING, A SMALL AMOUNT OF ECCHYMOSIS AT THE ENTRY SITE. 



BLOOD CULTURE WAS DONE. 

-------------------------------------------------------------------------------

Addendum: 01/12/23 at 1942 by Agency 08 RN RN

-------------------------------------------------------------------------------

HANDER OVER TO NEVA WEISS, THAT THE FAMILY WAS NOT INFORMED YET THAT THE PT. WAS RE-INTUBATED 
TODAY. NEVA MEDINA SAID THAT SHE WILL CALL THE FAMILY.

## 2023-01-13 VITALS — DIASTOLIC BLOOD PRESSURE: 73 MMHG | SYSTOLIC BLOOD PRESSURE: 121 MMHG

## 2023-01-13 VITALS — DIASTOLIC BLOOD PRESSURE: 58 MMHG | SYSTOLIC BLOOD PRESSURE: 115 MMHG

## 2023-01-13 VITALS — DIASTOLIC BLOOD PRESSURE: 65 MMHG | SYSTOLIC BLOOD PRESSURE: 111 MMHG

## 2023-01-13 VITALS — SYSTOLIC BLOOD PRESSURE: 107 MMHG | DIASTOLIC BLOOD PRESSURE: 59 MMHG

## 2023-01-13 VITALS — DIASTOLIC BLOOD PRESSURE: 55 MMHG | SYSTOLIC BLOOD PRESSURE: 98 MMHG

## 2023-01-13 VITALS — SYSTOLIC BLOOD PRESSURE: 119 MMHG | DIASTOLIC BLOOD PRESSURE: 68 MMHG

## 2023-01-13 VITALS — SYSTOLIC BLOOD PRESSURE: 120 MMHG | DIASTOLIC BLOOD PRESSURE: 66 MMHG

## 2023-01-13 VITALS — DIASTOLIC BLOOD PRESSURE: 57 MMHG | SYSTOLIC BLOOD PRESSURE: 112 MMHG

## 2023-01-13 VITALS — DIASTOLIC BLOOD PRESSURE: 59 MMHG | SYSTOLIC BLOOD PRESSURE: 104 MMHG

## 2023-01-13 VITALS — DIASTOLIC BLOOD PRESSURE: 74 MMHG | SYSTOLIC BLOOD PRESSURE: 147 MMHG

## 2023-01-13 VITALS — SYSTOLIC BLOOD PRESSURE: 116 MMHG | DIASTOLIC BLOOD PRESSURE: 64 MMHG

## 2023-01-13 VITALS — SYSTOLIC BLOOD PRESSURE: 123 MMHG | DIASTOLIC BLOOD PRESSURE: 63 MMHG

## 2023-01-13 VITALS — DIASTOLIC BLOOD PRESSURE: 63 MMHG | SYSTOLIC BLOOD PRESSURE: 107 MMHG

## 2023-01-13 VITALS — DIASTOLIC BLOOD PRESSURE: 59 MMHG | SYSTOLIC BLOOD PRESSURE: 113 MMHG

## 2023-01-13 VITALS — SYSTOLIC BLOOD PRESSURE: 107 MMHG | DIASTOLIC BLOOD PRESSURE: 63 MMHG

## 2023-01-13 VITALS — SYSTOLIC BLOOD PRESSURE: 113 MMHG | DIASTOLIC BLOOD PRESSURE: 65 MMHG

## 2023-01-13 VITALS — SYSTOLIC BLOOD PRESSURE: 119 MMHG | DIASTOLIC BLOOD PRESSURE: 66 MMHG

## 2023-01-13 VITALS — DIASTOLIC BLOOD PRESSURE: 63 MMHG | SYSTOLIC BLOOD PRESSURE: 132 MMHG

## 2023-01-13 VITALS — DIASTOLIC BLOOD PRESSURE: 74 MMHG | SYSTOLIC BLOOD PRESSURE: 101 MMHG

## 2023-01-13 LAB
ANION GAP SERPL CALCULATED.3IONS-SCNC: 9.8 MMOL/L (ref 8–16)
BASOPHILS # BLD AUTO: 0.3 K/UL (ref 0–0.22)
BASOPHILS NFR BLD AUTO: 1.2 % (ref 0–2)
BUN SERPL-MCNC: 16 MG/DL (ref 7–18)
CHLORIDE SERPL-SCNC: 107 MMOL/L (ref 98–107)
CO2 SERPL-SCNC: 33 MMOL/L (ref 21–32)
CREAT SERPL-MCNC: 0.7 MG/DL (ref 0.6–1.3)
EOSINOPHIL # BLD AUTO: 0.3 K/UL (ref 0–0.4)
EOSINOPHIL NFR BLD AUTO: 2.4 % (ref 0–4)
ERYTHROCYTE [DISTWIDTH] IN BLOOD BY AUTOMATED COUNT: 14.4 % (ref 11.6–13.7)
GFR SERPL CREATININE-BSD FRML MDRD: 152 ML/MIN (ref 90–?)
GLUCOSE SERPL-MCNC: 81 MG/DL (ref 74–106)
HCT VFR BLD AUTO: 28.9 % (ref 36–52)
HGB BLD-MCNC: 10.1 G/DL (ref 12–18)
LYMPHOCYTES # BLD AUTO: 2.1 K/UL (ref 2–11.5)
LYMPHOCYTES NFR BLD AUTO: 19.7 % (ref 20.5–51.1)
LYMPHOCYTES NFR BLD MANUAL: 21 % (ref 20–46)
MAGNESIUM SERPL-MCNC: 1.9 MG/DL (ref 1.8–2.4)
MCH RBC QN AUTO: 35 PG (ref 27–31)
MCHC RBC AUTO-ENTMCNC: 35 G/DL (ref 33–37)
MCV RBC AUTO: 100.1 FL (ref 80–94)
MONOCYTES # BLD AUTO: 0.6 K/UL (ref 0.8–1)
MONOCYTES NFR BLD AUTO: 5.3 % (ref 1.7–9.3)
MONOCYTES NFR BLD MANUAL: 9 % (ref 5–12)
NEUTROPHILS # BLD AUTO: 7.6 K/UL (ref 1.8–7.7)
NEUTROPHILS NFR BLD AUTO: 71.4 % (ref 42.2–75.2)
PHOSPHATE SERPL-MCNC: 3.7 MG/DL (ref 2.5–4.9)
PLATELET # BLD AUTO: 101 K/UL (ref 140–450)
POTASSIUM SERPL-SCNC: 2.8 MMOL/L (ref 3.5–5.1)
RBC # BLD AUTO: 2.88 MIL/UL (ref 4.2–6.1)
SODIUM SERPL-SCNC: 147 MMOL/L (ref 136–145)
WBC # BLD AUTO: 10.7 K/UL (ref 4.8–10.8)

## 2023-01-13 RX ADMIN — VALPROIC ACID SCH MG: 250 SOLUTION ORAL at 21:11

## 2023-01-13 RX ADMIN — MIDODRINE HYDROCHLORIDE SCH MG: 5 TABLET ORAL at 23:14

## 2023-01-13 RX ADMIN — BENZTROPINE MESYLATE SCH MG: 1 TABLET ORAL at 08:40

## 2023-01-13 RX ADMIN — DEXTROSE SCH MLS/HR: 50 INJECTION, SOLUTION INTRAVENOUS at 18:07

## 2023-01-13 RX ADMIN — HYDROCODONE BITARTRATE AND ACETAMINOPHEN PRN TAB: 5; 325 TABLET ORAL at 07:36

## 2023-01-13 RX ADMIN — DEXTROSE SCH MLS/HR: 50 INJECTION, SOLUTION INTRAVENOUS at 23:13

## 2023-01-13 RX ADMIN — AMIODARONE HYDROCHLORIDE SCH MG: 200 TABLET ORAL at 08:39

## 2023-01-13 RX ADMIN — VALPROIC ACID SCH MG: 250 SOLUTION ORAL at 08:22

## 2023-01-13 RX ADMIN — MIDODRINE HYDROCHLORIDE SCH MG: 5 TABLET ORAL at 00:22

## 2023-01-13 RX ADMIN — SODIUM CHLORIDE SCH MLS/HR: 9 INJECTION, SOLUTION INTRAVENOUS at 05:47

## 2023-01-13 RX ADMIN — DEXTROSE SCH MLS/HR: 5 SOLUTION INTRAVENOUS at 21:09

## 2023-01-13 RX ADMIN — MIDODRINE HYDROCHLORIDE SCH MG: 5 TABLET ORAL at 05:46

## 2023-01-13 RX ADMIN — POTASSIUM CHLORIDE SCH MEQ: 40 SOLUTION ORAL at 10:45

## 2023-01-13 RX ADMIN — FUROSEMIDE SCH MG: 10 INJECTION, SOLUTION INTRAMUSCULAR; INTRAVENOUS at 21:10

## 2023-01-13 RX ADMIN — DEXTROSE SCH MLS/HR: 50 INJECTION, SOLUTION INTRAVENOUS at 00:21

## 2023-01-13 RX ADMIN — BENZTROPINE MESYLATE SCH MG: 1 TABLET ORAL at 21:14

## 2023-01-13 RX ADMIN — POTASSIUM CHLORIDE PRN MLS/HR: 200 INJECTION, SOLUTION INTRAVENOUS at 07:21

## 2023-01-13 RX ADMIN — FUROSEMIDE SCH MG: 10 INJECTION, SOLUTION INTRAMUSCULAR; INTRAVENOUS at 08:39

## 2023-01-13 RX ADMIN — DEXTROSE SCH MLS/HR: 50 INJECTION, SOLUTION INTRAVENOUS at 05:46

## 2023-01-13 RX ADMIN — MIDODRINE HYDROCHLORIDE SCH MG: 5 TABLET ORAL at 18:07

## 2023-01-13 RX ADMIN — HYDROCODONE BITARTRATE AND ACETAMINOPHEN PRN TAB: 5; 325 TABLET ORAL at 16:00

## 2023-01-13 RX ADMIN — DEXTROSE SCH MLS/HR: 50 INJECTION, SOLUTION INTRAVENOUS at 11:22

## 2023-01-13 RX ADMIN — POTASSIUM CHLORIDE PRN MLS/HR: 200 INJECTION, SOLUTION INTRAVENOUS at 10:35

## 2023-01-13 RX ADMIN — ENOXAPARIN SODIUM SCH MG: 40 INJECTION SUBCUTANEOUS at 08:47

## 2023-01-13 RX ADMIN — MIDODRINE HYDROCHLORIDE SCH MG: 5 TABLET ORAL at 11:23

## 2023-01-13 RX ADMIN — DEXTROSE SCH MLS/HR: 50 INJECTION, SOLUTION INTRAVENOUS at 23:19

## 2023-01-13 RX ADMIN — AMIODARONE HYDROCHLORIDE SCH MG: 200 TABLET ORAL at 21:10

## 2023-01-13 RX ADMIN — DEXTROSE SCH MLS/HR: 5 SOLUTION INTRAVENOUS at 08:41

## 2023-01-13 NOTE — NUR
REPORT GIVEN TO NIGHT ABHISHEK PADILLA. PT.INTUBATED, AWAKE, NODS TO QUESTION OF PAIN, OR WHEN 
INFORMED OF ANY PROCEDURE/TURNING. TUBE FEEDING. NO IVF NOW. FOR TRANSFER TO [facility]. [facility] 
CALLED, AWARE OF THE TRANSFER AS REQUESTED BY THE PATIENT'S SISTER TO CALL ICU.

## 2023-01-13 NOTE — NUR
RECEIVED  PATIENT AWAKE. ABLE TO NOD TO QUESTIONS ANSWERABLE TO YES OR 
NO.AFEBRILE.ASYMPTOMATIC OF PAIN AND DISCOMFORT ON VENT SETTIGS AS ACVC 18,FIO2 35  
PEEP 5..SUCTIONING OF SECRETIONS SCANT TO MODRATE YELLOWISH CLORED. EVERY 2 HOURS AND PRN.SR 
ON MONITOR NOECTOPY.ON VITAL AF AT 50 ML/HOUR .SKIN REMAINS INTACT.NOTED WITH GENERALIZES 
EDEMA ON EXTREMITIES.WILL PROCEED WITH CARE.

## 2023-01-13 NOTE — NUR
RECEIVED PATIENT FROM NIGHT ABHISHEK MEJIA. PT. INTUBATED ORALLY AC/VC. AWAKE, NODS TO QUESTION 
OF PAIN. ASKED IF HE KNOWS WHERE HE IS, PT. NODS ALSO. NO RESTRAINTS, DOES NOT TRY TO PULL 
OR FLEX HIS ARMS. TUBE FEEDING BY OGT. CASTILLO CATHETER. NSR WITH SOME PAC ON MONITOR. 



LAB. CALLED AT 0720 AM, K+ 2.8, REPLACING WITH 20 mEq I.V. X 2.

## 2023-01-13 NOTE — NUR
DR. WYNN CAME AND NOTED SpO2 100%, DECREASED FiO2 TO 35% FROM 45%.



INFORMED K+ 2.8 THIS MORNING, REPLACING WITH 40 mEq I.V. KCl. NO OTHER ORDERS. 



FOR TRANSFER OUT TO [facility], CAN BE TRACHEOSTOMY THERE IF NEEDED. 



JACOBO YOUNG [phone]. CALLED UP EARLIER, AND SAID THAT HER AUNT (PT'S. SISTER) 
IS AWARE OF THE TRANSFER ORDER.

## 2023-01-14 VITALS — DIASTOLIC BLOOD PRESSURE: 53 MMHG | SYSTOLIC BLOOD PRESSURE: 103 MMHG

## 2023-01-14 VITALS — DIASTOLIC BLOOD PRESSURE: 58 MMHG | SYSTOLIC BLOOD PRESSURE: 135 MMHG

## 2023-01-14 VITALS — SYSTOLIC BLOOD PRESSURE: 96 MMHG | DIASTOLIC BLOOD PRESSURE: 47 MMHG

## 2023-01-14 VITALS — DIASTOLIC BLOOD PRESSURE: 59 MMHG | SYSTOLIC BLOOD PRESSURE: 101 MMHG

## 2023-01-14 VITALS — SYSTOLIC BLOOD PRESSURE: 104 MMHG | DIASTOLIC BLOOD PRESSURE: 55 MMHG

## 2023-01-14 VITALS — DIASTOLIC BLOOD PRESSURE: 46 MMHG | SYSTOLIC BLOOD PRESSURE: 91 MMHG

## 2023-01-14 VITALS — SYSTOLIC BLOOD PRESSURE: 121 MMHG | DIASTOLIC BLOOD PRESSURE: 64 MMHG

## 2023-01-14 VITALS — DIASTOLIC BLOOD PRESSURE: 52 MMHG | SYSTOLIC BLOOD PRESSURE: 102 MMHG

## 2023-01-14 VITALS — DIASTOLIC BLOOD PRESSURE: 104 MMHG | SYSTOLIC BLOOD PRESSURE: 153 MMHG

## 2023-01-14 VITALS — DIASTOLIC BLOOD PRESSURE: 50 MMHG | SYSTOLIC BLOOD PRESSURE: 111 MMHG

## 2023-01-14 VITALS — DIASTOLIC BLOOD PRESSURE: 54 MMHG | SYSTOLIC BLOOD PRESSURE: 100 MMHG

## 2023-01-14 VITALS — DIASTOLIC BLOOD PRESSURE: 57 MMHG | SYSTOLIC BLOOD PRESSURE: 103 MMHG

## 2023-01-14 VITALS — DIASTOLIC BLOOD PRESSURE: 41 MMHG | SYSTOLIC BLOOD PRESSURE: 86 MMHG

## 2023-01-14 VITALS — SYSTOLIC BLOOD PRESSURE: 105 MMHG | DIASTOLIC BLOOD PRESSURE: 59 MMHG

## 2023-01-14 VITALS — SYSTOLIC BLOOD PRESSURE: 114 MMHG | DIASTOLIC BLOOD PRESSURE: 68 MMHG

## 2023-01-14 VITALS — DIASTOLIC BLOOD PRESSURE: 63 MMHG | SYSTOLIC BLOOD PRESSURE: 121 MMHG

## 2023-01-14 VITALS — SYSTOLIC BLOOD PRESSURE: 92 MMHG | DIASTOLIC BLOOD PRESSURE: 47 MMHG

## 2023-01-14 LAB
ANION GAP SERPL CALCULATED.3IONS-SCNC: 9.4 MMOL/L (ref 8–16)
BUN SERPL-MCNC: 16 MG/DL (ref 7–18)
CHLORIDE SERPL-SCNC: 105 MMOL/L (ref 98–107)
CO2 SERPL-SCNC: 35.1 MMOL/L (ref 21–32)
CREAT SERPL-MCNC: 0.7 MG/DL (ref 0.6–1.3)
GFR SERPL CREATININE-BSD FRML MDRD: 152 ML/MIN (ref 90–?)
GLUCOSE SERPL-MCNC: 96 MG/DL (ref 74–106)
POTASSIUM SERPL-SCNC: 2.5 MMOL/L (ref 3.5–5.1)
SODIUM SERPL-SCNC: 147 MMOL/L (ref 136–145)

## 2023-01-14 RX ADMIN — DEXTROSE SCH MLS/HR: 50 INJECTION, SOLUTION INTRAVENOUS at 11:49

## 2023-01-14 RX ADMIN — VALPROIC ACID SCH MG: 250 SOLUTION ORAL at 08:50

## 2023-01-14 RX ADMIN — BENZTROPINE MESYLATE SCH MG: 1 TABLET ORAL at 08:52

## 2023-01-14 RX ADMIN — FUROSEMIDE SCH MG: 10 INJECTION, SOLUTION INTRAMUSCULAR; INTRAVENOUS at 20:32

## 2023-01-14 RX ADMIN — MIDODRINE HYDROCHLORIDE SCH MG: 5 TABLET ORAL at 04:54

## 2023-01-14 RX ADMIN — AMIODARONE HYDROCHLORIDE SCH MG: 200 TABLET ORAL at 20:33

## 2023-01-14 RX ADMIN — DEXTROSE SCH MLS/HR: 5 SOLUTION INTRAVENOUS at 20:31

## 2023-01-14 RX ADMIN — MIDODRINE HYDROCHLORIDE SCH MG: 5 TABLET ORAL at 23:28

## 2023-01-14 RX ADMIN — DEXTROSE SCH MLS/HR: 5 SOLUTION INTRAVENOUS at 09:29

## 2023-01-14 RX ADMIN — IPRATROPIUM BROMIDE AND ALBUTEROL SULFATE SCH ML: .5; 3 SOLUTION RESPIRATORY (INHALATION) at 13:45

## 2023-01-14 RX ADMIN — FUROSEMIDE SCH MG: 10 INJECTION, SOLUTION INTRAMUSCULAR; INTRAVENOUS at 12:11

## 2023-01-14 RX ADMIN — MIDODRINE HYDROCHLORIDE SCH MG: 5 TABLET ORAL at 18:03

## 2023-01-14 RX ADMIN — DEXTROSE SCH MLS/HR: 50 INJECTION, SOLUTION INTRAVENOUS at 17:34

## 2023-01-14 RX ADMIN — VALPROIC ACID SCH MG: 250 SOLUTION ORAL at 20:34

## 2023-01-14 RX ADMIN — AMIODARONE HYDROCHLORIDE SCH MG: 200 TABLET ORAL at 08:50

## 2023-01-14 RX ADMIN — BENZTROPINE MESYLATE SCH MG: 1 TABLET ORAL at 20:34

## 2023-01-14 RX ADMIN — POTASSIUM CHLORIDE SCH MEQ: 40 SOLUTION ORAL at 08:53

## 2023-01-14 RX ADMIN — POTASSIUM CHLORIDE PRN MLS/HR: 200 INJECTION, SOLUTION INTRAVENOUS at 07:42

## 2023-01-14 RX ADMIN — SODIUM CHLORIDE PRN MG: 9 INJECTION, SOLUTION INTRAVENOUS at 09:19

## 2023-01-14 RX ADMIN — IPRATROPIUM BROMIDE AND ALBUTEROL SULFATE SCH ML: .5; 3 SOLUTION RESPIRATORY (INHALATION) at 19:00

## 2023-01-14 RX ADMIN — ENOXAPARIN SODIUM SCH MG: 40 INJECTION SUBCUTANEOUS at 08:54

## 2023-01-14 RX ADMIN — MIDODRINE HYDROCHLORIDE SCH MG: 5 TABLET ORAL at 12:10

## 2023-01-14 RX ADMIN — DEXTROSE SCH MLS/HR: 50 INJECTION, SOLUTION INTRAVENOUS at 23:07

## 2023-01-14 NOTE — NUR
ASSUMED CARE OF THIS PATIENT AND ASSESSMENT DONE AND COMPLETED ,OPEN EYES AND STILL ABLE TO 
NOD TO ANSWERABLE QUESTIONS.AFEBRILE. SR AND SB ON THE MONITOR.WILL PROCEED WITH PRESENT 
CARE PLAN.SEEMINGLY FREE OF PAIN AND DISCOMFORT.REMAINS ON VENTILATOR WITH SAME SETTINGS AS 
AC/VC 18  FIO2 35 AND PEEP 5.REMAINS ON OGT OF VITAL AF AT 50 ML/HOUR .CASTILLO IN PLACE 
AND INTACT DRAINING ADEQUATE AMOUNT OF URINE.SKIN INTACT.

## 2023-01-14 NOTE — NUR
received report from endorsing night shift RN for continuity of care, patient lying on bed 
;vent setting on AC rate of 18, fio2 35%, tidal volume of 400 and PEEP of 5.tube feeding in 
place vital AF @ 50 ml.Murillo catheter in place, yellow urine in color draining to gravity. 
no signs of acute distress noted at this time will continue to monitor.

## 2023-01-14 NOTE — NUR
RESTING WELL NO EVIDENCE FOR RESPIRATORY DISTRESS NOTED GOOD CHEST RISE ENDOTRACHEAL SUCTION 
FOR MODERATE THIN YELLOW SECRETIONS AIRWAY PATENT SPUTUM CULTURE SPECIMEN OBTAINED FORWARDED 
TO LAB

## 2023-01-14 NOTE — NUR
RECEIVED ON A TractionAPE R860 VENTILATOR PLUGGED INTO RED OUTLET TOLERATING WITHOUT 
ADVERSE REACTIONS NOTED TO AN ENDOTRACHEAL TUBE #8.0 SECURED AT 25cm TEETH/GUM LINE WITH AN 
ANCHOR FAST CUFF PRESSURE CHECKED AS NOTED AMBU BAG AT BEDSIDE STABLE EQUAL CHEST RISE 
ENDOTRACHEAL SUCTION FOR COPIOUS THIN PALE YELLOW SECRETIONS AIRWAY PATENT

## 2023-01-15 VITALS — SYSTOLIC BLOOD PRESSURE: 104 MMHG | DIASTOLIC BLOOD PRESSURE: 50 MMHG

## 2023-01-15 VITALS — SYSTOLIC BLOOD PRESSURE: 109 MMHG | DIASTOLIC BLOOD PRESSURE: 52 MMHG

## 2023-01-15 VITALS — DIASTOLIC BLOOD PRESSURE: 57 MMHG | SYSTOLIC BLOOD PRESSURE: 107 MMHG

## 2023-01-15 VITALS — DIASTOLIC BLOOD PRESSURE: 62 MMHG | SYSTOLIC BLOOD PRESSURE: 103 MMHG

## 2023-01-15 VITALS — DIASTOLIC BLOOD PRESSURE: 59 MMHG | SYSTOLIC BLOOD PRESSURE: 109 MMHG

## 2023-01-15 VITALS — DIASTOLIC BLOOD PRESSURE: 46 MMHG | SYSTOLIC BLOOD PRESSURE: 92 MMHG

## 2023-01-15 VITALS — SYSTOLIC BLOOD PRESSURE: 108 MMHG | DIASTOLIC BLOOD PRESSURE: 53 MMHG

## 2023-01-15 VITALS — SYSTOLIC BLOOD PRESSURE: 118 MMHG | DIASTOLIC BLOOD PRESSURE: 68 MMHG

## 2023-01-15 VITALS — SYSTOLIC BLOOD PRESSURE: 119 MMHG | DIASTOLIC BLOOD PRESSURE: 64 MMHG

## 2023-01-15 VITALS — SYSTOLIC BLOOD PRESSURE: 114 MMHG | DIASTOLIC BLOOD PRESSURE: 64 MMHG

## 2023-01-15 VITALS — SYSTOLIC BLOOD PRESSURE: 111 MMHG | DIASTOLIC BLOOD PRESSURE: 56 MMHG

## 2023-01-15 VITALS — SYSTOLIC BLOOD PRESSURE: 116 MMHG | DIASTOLIC BLOOD PRESSURE: 59 MMHG

## 2023-01-15 VITALS — SYSTOLIC BLOOD PRESSURE: 124 MMHG | DIASTOLIC BLOOD PRESSURE: 61 MMHG

## 2023-01-15 VITALS — SYSTOLIC BLOOD PRESSURE: 99 MMHG | DIASTOLIC BLOOD PRESSURE: 57 MMHG

## 2023-01-15 VITALS — SYSTOLIC BLOOD PRESSURE: 99 MMHG | DIASTOLIC BLOOD PRESSURE: 51 MMHG

## 2023-01-15 VITALS — SYSTOLIC BLOOD PRESSURE: 101 MMHG | DIASTOLIC BLOOD PRESSURE: 50 MMHG

## 2023-01-15 LAB
ANION GAP SERPL CALCULATED.3IONS-SCNC: 8.7 MMOL/L (ref 8–16)
BASOPHILS # BLD AUTO: 0 K/UL (ref 0–0.22)
BASOPHILS NFR BLD AUTO: 0.6 % (ref 0–2)
BUN SERPL-MCNC: 18 MG/DL (ref 7–18)
CHLORIDE SERPL-SCNC: 104 MMOL/L (ref 98–107)
CO2 SERPL-SCNC: 35.8 MMOL/L (ref 21–32)
CREAT SERPL-MCNC: 0.8 MG/DL (ref 0.6–1.3)
EOSINOPHIL # BLD AUTO: 0.1 K/UL (ref 0–0.4)
EOSINOPHIL NFR BLD AUTO: 1.3 % (ref 0–4)
ERYTHROCYTE [DISTWIDTH] IN BLOOD BY AUTOMATED COUNT: 14.2 % (ref 11.6–13.7)
GFR SERPL CREATININE-BSD FRML MDRD: 130 ML/MIN (ref 90–?)
GLUCOSE SERPL-MCNC: 100 MG/DL (ref 74–106)
HCT VFR BLD AUTO: 22.3 % (ref 36–52)
HGB BLD-MCNC: 7.9 G/DL (ref 12–18)
LYMPHOCYTES # BLD AUTO: 1.6 K/UL (ref 2–11.5)
LYMPHOCYTES NFR BLD AUTO: 22.6 % (ref 20.5–51.1)
MAGNESIUM SERPL-MCNC: 2 MG/DL (ref 1.8–2.4)
MCH RBC QN AUTO: 35 PG (ref 27–31)
MCHC RBC AUTO-ENTMCNC: 35 G/DL (ref 33–37)
MCV RBC AUTO: 98.2 FL (ref 80–94)
MONOCYTES # BLD AUTO: 1 K/UL (ref 0.8–1)
MONOCYTES NFR BLD AUTO: 15 % (ref 1.7–9.3)
NEUTROPHILS # BLD AUTO: 4.2 K/UL (ref 1.8–7.7)
NEUTROPHILS NFR BLD AUTO: 60.5 % (ref 42.2–75.2)
PHOSPHATE SERPL-MCNC: 3.3 MG/DL (ref 2.5–4.9)
PLATELET # BLD AUTO: 145 K/UL (ref 140–450)
POTASSIUM SERPL-SCNC: 2.5 MMOL/L (ref 3.5–5.1)
RBC # BLD AUTO: 2.27 MIL/UL (ref 4.2–6.1)
SODIUM SERPL-SCNC: 146 MMOL/L (ref 136–145)
WBC # BLD AUTO: 7 K/UL (ref 4.8–10.8)

## 2023-01-15 RX ADMIN — AMIODARONE HYDROCHLORIDE SCH MG: 200 TABLET ORAL at 08:33

## 2023-01-15 RX ADMIN — VALPROIC ACID SCH MG: 250 SOLUTION ORAL at 21:12

## 2023-01-15 RX ADMIN — AMIODARONE HYDROCHLORIDE SCH MG: 200 TABLET ORAL at 21:12

## 2023-01-15 RX ADMIN — PANTOPRAZOLE SODIUM SCH MG: 40 TABLET, DELAYED RELEASE ORAL at 08:34

## 2023-01-15 RX ADMIN — BENZTROPINE MESYLATE SCH MG: 1 TABLET ORAL at 21:15

## 2023-01-15 RX ADMIN — FUROSEMIDE SCH MG: 10 INJECTION, SOLUTION INTRAMUSCULAR; INTRAVENOUS at 21:12

## 2023-01-15 RX ADMIN — BENZTROPINE MESYLATE SCH MG: 1 TABLET ORAL at 08:30

## 2023-01-15 RX ADMIN — MIDODRINE HYDROCHLORIDE SCH MG: 5 TABLET ORAL at 23:40

## 2023-01-15 RX ADMIN — POTASSIUM CHLORIDE PRN MLS/HR: 200 INJECTION, SOLUTION INTRAVENOUS at 12:07

## 2023-01-15 RX ADMIN — FUROSEMIDE SCH MG: 10 INJECTION, SOLUTION INTRAMUSCULAR; INTRAVENOUS at 08:34

## 2023-01-15 RX ADMIN — POTASSIUM CHLORIDE SCH MEQ: 40 SOLUTION ORAL at 08:31

## 2023-01-15 RX ADMIN — DEXTROSE SCH MLS/HR: 5 SOLUTION INTRAVENOUS at 21:11

## 2023-01-15 RX ADMIN — IPRATROPIUM BROMIDE AND ALBUTEROL SULFATE SCH ML: .5; 3 SOLUTION RESPIRATORY (INHALATION) at 07:09

## 2023-01-15 RX ADMIN — POTASSIUM CHLORIDE PRN MLS/HR: 200 INJECTION, SOLUTION INTRAVENOUS at 07:36

## 2023-01-15 RX ADMIN — VALPROIC ACID SCH MG: 250 SOLUTION ORAL at 08:32

## 2023-01-15 RX ADMIN — ENOXAPARIN SODIUM SCH MG: 40 INJECTION SUBCUTANEOUS at 08:32

## 2023-01-15 RX ADMIN — DEXTROSE SCH MLS/HR: 50 INJECTION, SOLUTION INTRAVENOUS at 05:33

## 2023-01-15 RX ADMIN — MIDODRINE HYDROCHLORIDE SCH MG: 5 TABLET ORAL at 17:21

## 2023-01-15 RX ADMIN — IPRATROPIUM BROMIDE AND ALBUTEROL SULFATE SCH ML: .5; 3 SOLUTION RESPIRATORY (INHALATION) at 12:43

## 2023-01-15 RX ADMIN — DEXTROSE SCH MLS/HR: 50 INJECTION, SOLUTION INTRAVENOUS at 17:21

## 2023-01-15 RX ADMIN — DEXTROSE SCH MLS/HR: 50 INJECTION, SOLUTION INTRAVENOUS at 23:38

## 2023-01-15 RX ADMIN — IPRATROPIUM BROMIDE AND ALBUTEROL SULFATE SCH ML: .5; 3 SOLUTION RESPIRATORY (INHALATION) at 01:00

## 2023-01-15 RX ADMIN — DEXTROSE SCH MLS/HR: 5 SOLUTION INTRAVENOUS at 08:30

## 2023-01-15 RX ADMIN — MIDODRINE HYDROCHLORIDE SCH MG: 5 TABLET ORAL at 05:38

## 2023-01-15 RX ADMIN — IPRATROPIUM BROMIDE AND ALBUTEROL SULFATE SCH ML: .5; 3 SOLUTION RESPIRATORY (INHALATION) at 19:00

## 2023-01-15 RX ADMIN — MIDODRINE HYDROCHLORIDE SCH MG: 5 TABLET ORAL at 11:17

## 2023-01-15 RX ADMIN — DEXTROSE SCH MLS/HR: 50 INJECTION, SOLUTION INTRAVENOUS at 11:17

## 2023-01-15 NOTE — NUR
1/15/23 RD FOLLOW UP COMPLETED.PLEASE REFER TO NUTRITION ASSESSMENT UNDER CARE ACTIVITY FOR 
ESTIMATED NUTRITIONAL NEEDS. 



1.RECOMMEND INCREASING VITAL AF 1.2 TO GOAL RATE 60 ML/HR AS TOLERATED,  ML Q8H OR 
PER MD.

AT GOAL RATE OF 60ML/HR, THIS WILL PROVIDE 1440 ML VOLUME, 1728 KCAL,  GRAMS OF 
PROTEIN, MEETING 90% OF ESTIMATED KCAL NEEDS % OF ESTIMATED PROTEIN NEEDS; ADEQUATE.

2. MONITOR GI SYMPTOMS & GASTRIC RESIDUALS.

3. CONSULT RD PRN.

4. RD TO FOLLOW-UP 2-3 DAYS, HIGH RISK.



JOANA HAMMOND RD no

## 2023-01-15 NOTE — NUR
PER DIETITIAN RECOMMENDATION, INCREASING VITAL AF 1.2 TO GOAL RATE 60 ML/HR AS TOLERATED, 
 ML Q8H OR PER MD.

## 2023-01-15 NOTE — NUR
RECEIVED REPORT FROM KEVIN WEISS FOR CONTINUITY OF PT'S CARE. PRT IS RECEIVED IN A SIDE LYING 
POSITION . NO DISTRESS NOTED . HE IS ETT TO VENT WITH SETTING AC/, RATE 18, AND 
PEEP 5 HIS LUNG SOUNDS ARE WITH RHONCHI.  HE HAS AN OGT IN PLACE  RECEIVING  VITAL AF  AT 
60CC/HR AND FREE WATER [id number] HR.  HE'S TOLERATING IT WELL WITH RESIDUAL 10CC. PT 
FOLLOWS SIMPLE COMMANDS. HE OPENED HIS EYES ON COMMAND  HE HAS A CASTILLO CATH AND PASSED 1300 
CC TODAY PER REPORT. AND HE HAD 3 BMS TODAY.  HIS PLAN IS TO BE PLACED IN A L TECH AS SOON 
AS POSSIBLE.

## 2023-01-15 NOTE — NUR
NOTIFIED DR BLACKMON. PT POTASSIUM 2.5, PT HAS SCHEDULE POTASSIUM CHLORIDE 20 MEQ G TUBE. 
ORDERED POTASSIUM CHLORIDE 40 MEQ IV, AND ANOTHER POTASSIUM CHLORIDE 40 MEQ IV IN 4 HR.

## 2023-01-15 NOTE — NUR
RECEIVED BEDSIDE REPORT FROM NIGHT SHIFT ISAK RN. PT AWAKE. ETT TO VENT. AC VC FIO2 25@, VT 
400, RR 18, PEEP 5. SR ON MONITOR. NO S/S OF ACUTE RESPIRATORY DISTRESS. PICC LINE TO LT 
UPPER ARM, RUNNING NS@ 5MLS/H. VITAL, TUBE FEEDING RUNNING @ 50MLS/HR,  MLS Q 8HR. 
CASTILLO IN PLACE TO GRAVITY. GENERALIZED WEAKNESS, BEDREST. BED TO LOWEST POSITION, HOB 
ELEVATED , CALL LIGHT WITH REACH, WILL CONTINUE TO MONITOR.

## 2023-01-16 VITALS — SYSTOLIC BLOOD PRESSURE: 119 MMHG | DIASTOLIC BLOOD PRESSURE: 66 MMHG

## 2023-01-16 VITALS — SYSTOLIC BLOOD PRESSURE: 109 MMHG | DIASTOLIC BLOOD PRESSURE: 56 MMHG

## 2023-01-16 VITALS — SYSTOLIC BLOOD PRESSURE: 107 MMHG | DIASTOLIC BLOOD PRESSURE: 57 MMHG

## 2023-01-16 VITALS — SYSTOLIC BLOOD PRESSURE: 111 MMHG | DIASTOLIC BLOOD PRESSURE: 61 MMHG

## 2023-01-16 VITALS — SYSTOLIC BLOOD PRESSURE: 129 MMHG | DIASTOLIC BLOOD PRESSURE: 71 MMHG

## 2023-01-16 VITALS — SYSTOLIC BLOOD PRESSURE: 111 MMHG | DIASTOLIC BLOOD PRESSURE: 63 MMHG

## 2023-01-16 VITALS — SYSTOLIC BLOOD PRESSURE: 119 MMHG | DIASTOLIC BLOOD PRESSURE: 64 MMHG

## 2023-01-16 VITALS — SYSTOLIC BLOOD PRESSURE: 111 MMHG | DIASTOLIC BLOOD PRESSURE: 64 MMHG

## 2023-01-16 VITALS — SYSTOLIC BLOOD PRESSURE: 110 MMHG | DIASTOLIC BLOOD PRESSURE: 58 MMHG

## 2023-01-16 VITALS — DIASTOLIC BLOOD PRESSURE: 65 MMHG | SYSTOLIC BLOOD PRESSURE: 113 MMHG

## 2023-01-16 VITALS — DIASTOLIC BLOOD PRESSURE: 57 MMHG | SYSTOLIC BLOOD PRESSURE: 101 MMHG

## 2023-01-16 VITALS — DIASTOLIC BLOOD PRESSURE: 64 MMHG | SYSTOLIC BLOOD PRESSURE: 110 MMHG

## 2023-01-16 VITALS — DIASTOLIC BLOOD PRESSURE: 57 MMHG | SYSTOLIC BLOOD PRESSURE: 111 MMHG

## 2023-01-16 VITALS — SYSTOLIC BLOOD PRESSURE: 103 MMHG | DIASTOLIC BLOOD PRESSURE: 62 MMHG

## 2023-01-16 VITALS — DIASTOLIC BLOOD PRESSURE: 57 MMHG | SYSTOLIC BLOOD PRESSURE: 104 MMHG

## 2023-01-16 VITALS — DIASTOLIC BLOOD PRESSURE: 55 MMHG | SYSTOLIC BLOOD PRESSURE: 106 MMHG

## 2023-01-16 VITALS — DIASTOLIC BLOOD PRESSURE: 69 MMHG | SYSTOLIC BLOOD PRESSURE: 106 MMHG

## 2023-01-16 VITALS — SYSTOLIC BLOOD PRESSURE: 105 MMHG | DIASTOLIC BLOOD PRESSURE: 60 MMHG

## 2023-01-16 VITALS — SYSTOLIC BLOOD PRESSURE: 110 MMHG | DIASTOLIC BLOOD PRESSURE: 59 MMHG

## 2023-01-16 VITALS — DIASTOLIC BLOOD PRESSURE: 56 MMHG | SYSTOLIC BLOOD PRESSURE: 107 MMHG

## 2023-01-16 LAB
ANION GAP SERPL CALCULATED.3IONS-SCNC: 8.4 MMOL/L (ref 8–16)
BASOPHILS # BLD AUTO: 0.1 K/UL (ref 0–0.22)
BASOPHILS NFR BLD AUTO: 0.9 % (ref 0–2)
BUN SERPL-MCNC: 17 MG/DL (ref 7–18)
CHLORIDE SERPL-SCNC: 101 MMOL/L (ref 98–107)
CO2 SERPL-SCNC: 36.4 MMOL/L (ref 21–32)
CREAT SERPL-MCNC: 0.7 MG/DL (ref 0.6–1.3)
EOSINOPHIL # BLD AUTO: 0.1 K/UL (ref 0–0.4)
EOSINOPHIL NFR BLD AUTO: 1 % (ref 0–4)
ERYTHROCYTE [DISTWIDTH] IN BLOOD BY AUTOMATED COUNT: 14.3 % (ref 11.6–13.7)
GFR SERPL CREATININE-BSD FRML MDRD: 152 ML/MIN (ref 90–?)
GLUCOSE SERPL-MCNC: 108 MG/DL (ref 74–106)
HCT VFR BLD AUTO: 23 % (ref 36–52)
HGB BLD-MCNC: 7.9 G/DL (ref 12–18)
LYMPHOCYTES # BLD AUTO: 1.7 K/UL (ref 2–11.5)
LYMPHOCYTES NFR BLD AUTO: 19.8 % (ref 20.5–51.1)
MAGNESIUM SERPL-MCNC: 1.9 MG/DL (ref 1.8–2.4)
MCH RBC QN AUTO: 34 PG (ref 27–31)
MCHC RBC AUTO-ENTMCNC: 34 G/DL (ref 33–37)
MCV RBC AUTO: 98.9 FL (ref 80–94)
MONOCYTES # BLD AUTO: 1.4 K/UL (ref 0.8–1)
MONOCYTES NFR BLD AUTO: 15.4 % (ref 1.7–9.3)
NEUTROPHILS # BLD AUTO: 5.5 K/UL (ref 1.8–7.7)
NEUTROPHILS NFR BLD AUTO: 62.9 % (ref 42.2–75.2)
PHOSPHATE SERPL-MCNC: 2.9 MG/DL (ref 2.5–4.9)
PLATELET # BLD AUTO: 225 K/UL (ref 140–450)
POTASSIUM SERPL-SCNC: 2.8 MMOL/L (ref 3.5–5.1)
RBC # BLD AUTO: 2.32 MIL/UL (ref 4.2–6.1)
SODIUM SERPL-SCNC: 143 MMOL/L (ref 136–145)
WBC # BLD AUTO: 8.8 K/UL (ref 4.8–10.8)

## 2023-01-16 RX ADMIN — AMIODARONE HYDROCHLORIDE SCH MG: 200 TABLET ORAL at 08:49

## 2023-01-16 RX ADMIN — ENOXAPARIN SODIUM SCH MG: 40 INJECTION SUBCUTANEOUS at 08:54

## 2023-01-16 RX ADMIN — POTASSIUM CHLORIDE SCH MEQ: 40 SOLUTION ORAL at 08:51

## 2023-01-16 RX ADMIN — MIDODRINE HYDROCHLORIDE SCH MG: 5 TABLET ORAL at 05:46

## 2023-01-16 RX ADMIN — DEXTROSE SCH MLS/HR: 50 INJECTION, SOLUTION INTRAVENOUS at 23:41

## 2023-01-16 RX ADMIN — MIDODRINE HYDROCHLORIDE SCH MG: 5 TABLET ORAL at 17:34

## 2023-01-16 RX ADMIN — BENZTROPINE MESYLATE SCH MG: 1 TABLET ORAL at 21:07

## 2023-01-16 RX ADMIN — BENZTROPINE MESYLATE SCH MG: 1 TABLET ORAL at 08:50

## 2023-01-16 RX ADMIN — PANTOPRAZOLE SODIUM SCH MG: 40 TABLET, DELAYED RELEASE ORAL at 08:49

## 2023-01-16 RX ADMIN — FUROSEMIDE SCH MG: 10 INJECTION, SOLUTION INTRAMUSCULAR; INTRAVENOUS at 08:48

## 2023-01-16 RX ADMIN — IPRATROPIUM BROMIDE AND ALBUTEROL SULFATE SCH ML: .5; 3 SOLUTION RESPIRATORY (INHALATION) at 19:01

## 2023-01-16 RX ADMIN — AMIODARONE HYDROCHLORIDE SCH MG: 200 TABLET ORAL at 21:07

## 2023-01-16 RX ADMIN — FUROSEMIDE SCH MG: 10 INJECTION, SOLUTION INTRAMUSCULAR; INTRAVENOUS at 21:08

## 2023-01-16 RX ADMIN — MIDODRINE HYDROCHLORIDE SCH MG: 5 TABLET ORAL at 23:41

## 2023-01-16 RX ADMIN — IPRATROPIUM BROMIDE AND ALBUTEROL SULFATE SCH ML: .5; 3 SOLUTION RESPIRATORY (INHALATION) at 07:33

## 2023-01-16 RX ADMIN — DEXTROSE SCH MLS/HR: 5 SOLUTION INTRAVENOUS at 08:50

## 2023-01-16 RX ADMIN — DEXTROSE SCH MLS/HR: 50 INJECTION, SOLUTION INTRAVENOUS at 17:34

## 2023-01-16 RX ADMIN — DEXTROSE SCH MLS/HR: 5 SOLUTION INTRAVENOUS at 21:12

## 2023-01-16 RX ADMIN — IPRATROPIUM BROMIDE AND ALBUTEROL SULFATE SCH ML: .5; 3 SOLUTION RESPIRATORY (INHALATION) at 13:32

## 2023-01-16 RX ADMIN — VALPROIC ACID SCH MG: 250 SOLUTION ORAL at 08:47

## 2023-01-16 RX ADMIN — DEXTROSE SCH MLS/HR: 50 INJECTION, SOLUTION INTRAVENOUS at 12:01

## 2023-01-16 RX ADMIN — MIDODRINE HYDROCHLORIDE SCH MG: 5 TABLET ORAL at 12:02

## 2023-01-16 RX ADMIN — VALPROIC ACID SCH MG: 250 SOLUTION ORAL at 21:09

## 2023-01-16 RX ADMIN — DEXTROSE SCH MLS/HR: 50 INJECTION, SOLUTION INTRAVENOUS at 05:45

## 2023-01-16 NOTE — NUR
RECEIVED PATIENT ON BED  AWAKE, ALERT, NODS HEAD IN RESPONSE TO YES ANSWER; ORALLY INTUBATED 
AND VENTILATED AT 25% FIO2; SO2 99%. CARDIACSCOPE SHOWS ON SINUS RHYTHM HR 70/MIN NO 
ARRHYTHMIAS SEEN. IVF IN PROGRESS NORMAL SALINE TO KVO.  VIA PICC LINE TO LEFT UPPER ARM; 
PATENT AND INTACT. ABDOMEN IS SOFT NON TENDER; ACTIVE BOWEL SOUNDS. ON CONTINUOUS TUBE 
FEEDING VITAL AF AT 60 ML/HR VIA OGT; TOLERATED WITH MINIMAL RESIDUAL. WITH RECTAL TUBE IN 
PLACE TO GRAVITY DRAINAGE BAG DRAINING TO BROWN YELLOW LIQUID STOOL; INTACT. WITH CASTILLO CATH 
IN PLACE TO DRAINAGE BAG, WITH CLEAR YELLOW URINE OUTPUT; PATENT AND INTACT.

## 2023-01-16 NOTE — NUR
RECEIVED PT ASLEEP, EASILY AROUSABLE, NOT IN ANY FORM OF DISTRESS. ASSESSMENT DONE. ABLE TO 
ANSWER SIMPLE QUESTIONS BY SHAKING/NODDING HEAD, ABLE TO FOLLOW COMMANDS. TOLERATING OGT 
FEEDINT AND CURRENT VENRT SETTING, O2 %. RECTAL TUBE LEAKING, LARGE AMOUNT OF WATERY 
BROWNISH STOOL LEAKED. GOOED PERICARE RENDERED, ORAL CARE DONE. REPOSITONED TO SIDE, 
SUPPORTED WITH PILLOWS. KEPT CLEAN AND DRY. ASPIRATION PRECAUTION MAINTAINED. SR. AFEBRILE. 
CONT TO MONITOR.

## 2023-01-16 NOTE — NUR
PT SLEEPING COMFORTABLY, VS STABLE, AFEBRILE. REPORT GIVEN TO INCOMING RN KONSTANTIN. GEN 
CONDITION CRITICAL.

## 2023-01-16 NOTE — NUR
Met pt awake alert, follows command moves all extremities,nods appropriately to answer 
questions, calm with care no sign of distress vitals signs stable, afebrile denies pain, 
oral care done, inline ETT suction tolerating ventilator support well, OGT with tube feeding 
placement checked and verified by auscultation residual <30cc. Murillo to gravity adequate 
urine output and rectal tube full with brown stool. Pt repositioned for comfort, will 
continue to monitor and treat as per care plan.

## 2023-01-17 VITALS — DIASTOLIC BLOOD PRESSURE: 60 MMHG | SYSTOLIC BLOOD PRESSURE: 106 MMHG

## 2023-01-17 VITALS — SYSTOLIC BLOOD PRESSURE: 100 MMHG | DIASTOLIC BLOOD PRESSURE: 74 MMHG

## 2023-01-17 VITALS — SYSTOLIC BLOOD PRESSURE: 123 MMHG | DIASTOLIC BLOOD PRESSURE: 64 MMHG

## 2023-01-17 VITALS — SYSTOLIC BLOOD PRESSURE: 110 MMHG | DIASTOLIC BLOOD PRESSURE: 60 MMHG

## 2023-01-17 VITALS — DIASTOLIC BLOOD PRESSURE: 61 MMHG | SYSTOLIC BLOOD PRESSURE: 109 MMHG

## 2023-01-17 VITALS — SYSTOLIC BLOOD PRESSURE: 108 MMHG | DIASTOLIC BLOOD PRESSURE: 56 MMHG

## 2023-01-17 VITALS — DIASTOLIC BLOOD PRESSURE: 54 MMHG | SYSTOLIC BLOOD PRESSURE: 123 MMHG

## 2023-01-17 VITALS — DIASTOLIC BLOOD PRESSURE: 43 MMHG | SYSTOLIC BLOOD PRESSURE: 113 MMHG

## 2023-01-17 VITALS — SYSTOLIC BLOOD PRESSURE: 113 MMHG | DIASTOLIC BLOOD PRESSURE: 43 MMHG

## 2023-01-17 VITALS — SYSTOLIC BLOOD PRESSURE: 108 MMHG | DIASTOLIC BLOOD PRESSURE: 55 MMHG

## 2023-01-17 VITALS — DIASTOLIC BLOOD PRESSURE: 68 MMHG | SYSTOLIC BLOOD PRESSURE: 132 MMHG

## 2023-01-17 VITALS — SYSTOLIC BLOOD PRESSURE: 116 MMHG | DIASTOLIC BLOOD PRESSURE: 57 MMHG

## 2023-01-17 VITALS — SYSTOLIC BLOOD PRESSURE: 102 MMHG | DIASTOLIC BLOOD PRESSURE: 50 MMHG

## 2023-01-17 VITALS — DIASTOLIC BLOOD PRESSURE: 53 MMHG | SYSTOLIC BLOOD PRESSURE: 99 MMHG

## 2023-01-17 VITALS — SYSTOLIC BLOOD PRESSURE: 117 MMHG | DIASTOLIC BLOOD PRESSURE: 67 MMHG

## 2023-01-17 VITALS — DIASTOLIC BLOOD PRESSURE: 71 MMHG | SYSTOLIC BLOOD PRESSURE: 119 MMHG

## 2023-01-17 VITALS — DIASTOLIC BLOOD PRESSURE: 64 MMHG | SYSTOLIC BLOOD PRESSURE: 123 MMHG

## 2023-01-17 VITALS — SYSTOLIC BLOOD PRESSURE: 121 MMHG | DIASTOLIC BLOOD PRESSURE: 63 MMHG

## 2023-01-17 VITALS — DIASTOLIC BLOOD PRESSURE: 56 MMHG | SYSTOLIC BLOOD PRESSURE: 105 MMHG

## 2023-01-17 VITALS — SYSTOLIC BLOOD PRESSURE: 103 MMHG | DIASTOLIC BLOOD PRESSURE: 59 MMHG

## 2023-01-17 VITALS — DIASTOLIC BLOOD PRESSURE: 55 MMHG | SYSTOLIC BLOOD PRESSURE: 104 MMHG

## 2023-01-17 VITALS — SYSTOLIC BLOOD PRESSURE: 119 MMHG | DIASTOLIC BLOOD PRESSURE: 71 MMHG

## 2023-01-17 VITALS — SYSTOLIC BLOOD PRESSURE: 111 MMHG | DIASTOLIC BLOOD PRESSURE: 62 MMHG

## 2023-01-17 VITALS — SYSTOLIC BLOOD PRESSURE: 104 MMHG | DIASTOLIC BLOOD PRESSURE: 61 MMHG

## 2023-01-17 LAB
ANION GAP SERPL CALCULATED.3IONS-SCNC: 8.2 MMOL/L (ref 8–16)
BASOPHILS # BLD AUTO: 0 K/UL (ref 0–0.22)
BASOPHILS NFR BLD AUTO: 0.3 % (ref 0–2)
BUN SERPL-MCNC: 17 MG/DL (ref 7–18)
CHLORIDE SERPL-SCNC: 98 MMOL/L (ref 98–107)
CO2 SERPL-SCNC: 35.9 MMOL/L (ref 21–32)
CREAT SERPL-MCNC: 0.8 MG/DL (ref 0.6–1.3)
EOSINOPHIL # BLD AUTO: 0.1 K/UL (ref 0–0.4)
EOSINOPHIL NFR BLD AUTO: 1.1 % (ref 0–4)
ERYTHROCYTE [DISTWIDTH] IN BLOOD BY AUTOMATED COUNT: 14.1 % (ref 11.6–13.7)
GFR SERPL CREATININE-BSD FRML MDRD: 130 ML/MIN (ref 90–?)
GLUCOSE SERPL-MCNC: 101 MG/DL (ref 74–106)
HCT VFR BLD AUTO: 22.3 % (ref 36–52)
HGB BLD-MCNC: 7.8 G/DL (ref 12–18)
LYMPHOCYTES # BLD AUTO: 2.1 K/UL (ref 2–11.5)
LYMPHOCYTES NFR BLD AUTO: 26.7 % (ref 20.5–51.1)
MAGNESIUM SERPL-MCNC: 2.4 MG/DL (ref 1.8–2.4)
MCH RBC QN AUTO: 35 PG (ref 27–31)
MCHC RBC AUTO-ENTMCNC: 35 G/DL (ref 33–37)
MCV RBC AUTO: 99 FL (ref 80–94)
MONOCYTES # BLD AUTO: 1.2 K/UL (ref 0.8–1)
MONOCYTES NFR BLD AUTO: 14.9 % (ref 1.7–9.3)
NEUTROPHILS # BLD AUTO: 4.5 K/UL (ref 1.8–7.7)
NEUTROPHILS NFR BLD AUTO: 57 % (ref 42.2–75.2)
PHOSPHATE SERPL-MCNC: 3.7 MG/DL (ref 2.5–4.9)
PLATELET # BLD AUTO: 269 K/UL (ref 140–450)
POTASSIUM SERPL-SCNC: 3.1 MMOL/L (ref 3.5–5.1)
RBC # BLD AUTO: 2.25 MIL/UL (ref 4.2–6.1)
SODIUM SERPL-SCNC: 139 MMOL/L (ref 136–145)
WBC # BLD AUTO: 7.9 K/UL (ref 4.8–10.8)

## 2023-01-17 RX ADMIN — BENZTROPINE MESYLATE SCH MG: 1 TABLET ORAL at 21:08

## 2023-01-17 RX ADMIN — AMIODARONE HYDROCHLORIDE SCH MG: 200 TABLET ORAL at 21:06

## 2023-01-17 RX ADMIN — FUROSEMIDE SCH MG: 10 INJECTION, SOLUTION INTRAMUSCULAR; INTRAVENOUS at 21:07

## 2023-01-17 RX ADMIN — DEXTROSE SCH MLS/HR: 5 SOLUTION INTRAVENOUS at 08:55

## 2023-01-17 RX ADMIN — DEXTROSE SCH MLS/HR: 50 INJECTION, SOLUTION INTRAVENOUS at 05:17

## 2023-01-17 RX ADMIN — MIDODRINE HYDROCHLORIDE SCH MG: 5 TABLET ORAL at 17:39

## 2023-01-17 RX ADMIN — ENOXAPARIN SODIUM SCH MG: 40 INJECTION SUBCUTANEOUS at 08:59

## 2023-01-17 RX ADMIN — VALPROIC ACID SCH MG: 250 SOLUTION ORAL at 21:06

## 2023-01-17 RX ADMIN — IPRATROPIUM BROMIDE AND ALBUTEROL SULFATE SCH ML: .5; 3 SOLUTION RESPIRATORY (INHALATION) at 07:36

## 2023-01-17 RX ADMIN — MORPHINE SULFATE PRN MG: 2 INJECTION, SOLUTION INTRAMUSCULAR; INTRAVENOUS at 18:38

## 2023-01-17 RX ADMIN — DEXTROSE SCH MLS/HR: 50 INJECTION, SOLUTION INTRAVENOUS at 11:47

## 2023-01-17 RX ADMIN — MIDODRINE HYDROCHLORIDE SCH MG: 5 TABLET ORAL at 11:46

## 2023-01-17 RX ADMIN — SODIUM CHLORIDE PRN MG: 9 INJECTION, SOLUTION INTRAVENOUS at 18:38

## 2023-01-17 RX ADMIN — FUROSEMIDE SCH MG: 10 INJECTION, SOLUTION INTRAMUSCULAR; INTRAVENOUS at 08:31

## 2023-01-17 RX ADMIN — MIDODRINE HYDROCHLORIDE SCH MG: 5 TABLET ORAL at 05:17

## 2023-01-17 RX ADMIN — PANTOPRAZOLE SODIUM SCH MG: 40 TABLET, DELAYED RELEASE ORAL at 08:24

## 2023-01-17 RX ADMIN — AMIODARONE HYDROCHLORIDE SCH MG: 200 TABLET ORAL at 08:29

## 2023-01-17 RX ADMIN — IPRATROPIUM BROMIDE AND ALBUTEROL SULFATE SCH ML: .5; 3 SOLUTION RESPIRATORY (INHALATION) at 17:00

## 2023-01-17 RX ADMIN — IPRATROPIUM BROMIDE AND ALBUTEROL SULFATE SCH ML: .5; 3 SOLUTION RESPIRATORY (INHALATION) at 01:07

## 2023-01-17 RX ADMIN — VALPROIC ACID SCH MG: 250 SOLUTION ORAL at 08:30

## 2023-01-17 RX ADMIN — IPRATROPIUM BROMIDE AND ALBUTEROL SULFATE SCH ML: .5; 3 SOLUTION RESPIRATORY (INHALATION) at 19:00

## 2023-01-17 RX ADMIN — DEXTROSE SCH MLS/HR: 5 SOLUTION INTRAVENOUS at 21:06

## 2023-01-17 RX ADMIN — DEXTROSE SCH MLS/HR: 50 INJECTION, SOLUTION INTRAVENOUS at 17:38

## 2023-01-17 RX ADMIN — POTASSIUM CHLORIDE SCH MEQ: 40 SOLUTION ORAL at 08:23

## 2023-01-17 RX ADMIN — SODIUM CHLORIDE PRN MG: 9 INJECTION, SOLUTION INTRAVENOUS at 10:27

## 2023-01-17 RX ADMIN — BENZTROPINE MESYLATE SCH MG: 1 TABLET ORAL at 08:36

## 2023-01-17 RX ADMIN — POTASSIUM CHLORIDE PRN MEQ: 40 SOLUTION ORAL at 09:19

## 2023-01-17 NOTE — NUR
VANCO TROUGH 18.6. CALLED AFTER HOURS PHARMACY TO CONFIRM IF IT IS STILL OKAY TO GIVE 2100 
VANCOMYCIN IV INFUSION. PHARMACIST STATED IT IS OKAY TO GIVE BECAUSE LAB VALUE IS FROM THE 
1-16-23 AND THE PHARMACIST HERE SHOULD HAVE ALREADY LOOKED AT IT. 2100 VANCOMYCIN IV BAG IN 
CASSETTE PROVIDED BY PHARMACIST.

## 2023-01-17 NOTE — NUR
received report from night shift ABHISHEK Salvador for continuity of care, patient lying on bed , 
vent settings  AC rate of 18, tidal volume 400, Fio2 23 and Peep of 5.on tube feeding Vital 
@ 60 cc/hr. Murillo catheter in place , rectal tube in place. no signs oc acute distress noted 
at this time, will continue to monitor.

## 2023-01-17 NOTE — NUR
Change of shift SBAR given to LIZET WEISS for continuity of pt's care as at this time vitals 
signs stable no changes iin pt's condition and care plan

## 2023-01-17 NOTE — NUR
Dr. Valenzuela is at the bedside assessing the patient, notify him about the patient Potassium 
level.

## 2023-01-18 VITALS — SYSTOLIC BLOOD PRESSURE: 117 MMHG | DIASTOLIC BLOOD PRESSURE: 66 MMHG

## 2023-01-18 VITALS — DIASTOLIC BLOOD PRESSURE: 60 MMHG | SYSTOLIC BLOOD PRESSURE: 94 MMHG

## 2023-01-18 VITALS — DIASTOLIC BLOOD PRESSURE: 53 MMHG | SYSTOLIC BLOOD PRESSURE: 91 MMHG

## 2023-01-18 VITALS — SYSTOLIC BLOOD PRESSURE: 97 MMHG | DIASTOLIC BLOOD PRESSURE: 54 MMHG

## 2023-01-18 VITALS — SYSTOLIC BLOOD PRESSURE: 98 MMHG | DIASTOLIC BLOOD PRESSURE: 60 MMHG

## 2023-01-18 VITALS — SYSTOLIC BLOOD PRESSURE: 108 MMHG | DIASTOLIC BLOOD PRESSURE: 57 MMHG

## 2023-01-18 VITALS — DIASTOLIC BLOOD PRESSURE: 63 MMHG | SYSTOLIC BLOOD PRESSURE: 94 MMHG

## 2023-01-18 VITALS — DIASTOLIC BLOOD PRESSURE: 55 MMHG | SYSTOLIC BLOOD PRESSURE: 92 MMHG

## 2023-01-18 VITALS — SYSTOLIC BLOOD PRESSURE: 111 MMHG | DIASTOLIC BLOOD PRESSURE: 62 MMHG

## 2023-01-18 VITALS — DIASTOLIC BLOOD PRESSURE: 72 MMHG | SYSTOLIC BLOOD PRESSURE: 105 MMHG

## 2023-01-18 VITALS — DIASTOLIC BLOOD PRESSURE: 55 MMHG | SYSTOLIC BLOOD PRESSURE: 93 MMHG

## 2023-01-18 VITALS — DIASTOLIC BLOOD PRESSURE: 69 MMHG | SYSTOLIC BLOOD PRESSURE: 109 MMHG

## 2023-01-18 VITALS — SYSTOLIC BLOOD PRESSURE: 88 MMHG | DIASTOLIC BLOOD PRESSURE: 54 MMHG

## 2023-01-18 VITALS — SYSTOLIC BLOOD PRESSURE: 105 MMHG | DIASTOLIC BLOOD PRESSURE: 59 MMHG

## 2023-01-18 VITALS — SYSTOLIC BLOOD PRESSURE: 103 MMHG | DIASTOLIC BLOOD PRESSURE: 56 MMHG

## 2023-01-18 VITALS — DIASTOLIC BLOOD PRESSURE: 74 MMHG | SYSTOLIC BLOOD PRESSURE: 119 MMHG

## 2023-01-18 VITALS — SYSTOLIC BLOOD PRESSURE: 112 MMHG | DIASTOLIC BLOOD PRESSURE: 64 MMHG

## 2023-01-18 VITALS — SYSTOLIC BLOOD PRESSURE: 94 MMHG | DIASTOLIC BLOOD PRESSURE: 63 MMHG

## 2023-01-18 VITALS — SYSTOLIC BLOOD PRESSURE: 117 MMHG | DIASTOLIC BLOOD PRESSURE: 62 MMHG

## 2023-01-18 LAB
ANION GAP SERPL CALCULATED.3IONS-SCNC: 9.6 MMOL/L (ref 8–16)
BASOPHILS # BLD AUTO: 0 K/UL (ref 0–0.22)
BASOPHILS NFR BLD AUTO: 0.6 % (ref 0–2)
BUN SERPL-MCNC: 17 MG/DL (ref 7–18)
CHLORIDE SERPL-SCNC: 98 MMOL/L (ref 98–107)
CO2 SERPL-SCNC: 35.3 MMOL/L (ref 21–32)
CREAT SERPL-MCNC: 0.8 MG/DL (ref 0.6–1.3)
EOSINOPHIL # BLD AUTO: 0.1 K/UL (ref 0–0.4)
EOSINOPHIL NFR BLD AUTO: 1 % (ref 0–4)
ERYTHROCYTE [DISTWIDTH] IN BLOOD BY AUTOMATED COUNT: 14.3 % (ref 11.6–13.7)
GFR SERPL CREATININE-BSD FRML MDRD: 130 ML/MIN (ref 90–?)
GLUCOSE SERPL-MCNC: 93 MG/DL (ref 74–106)
HCT VFR BLD AUTO: 23.1 % (ref 36–52)
HGB BLD-MCNC: 7.9 G/DL (ref 12–18)
LYMPHOCYTES # BLD AUTO: 2 K/UL (ref 2–11.5)
LYMPHOCYTES NFR BLD AUTO: 25.5 % (ref 20.5–51.1)
MAGNESIUM SERPL-MCNC: 2.1 MG/DL (ref 1.8–2.4)
MCH RBC QN AUTO: 35 PG (ref 27–31)
MCHC RBC AUTO-ENTMCNC: 34 G/DL (ref 33–37)
MCV RBC AUTO: 101 FL (ref 80–94)
MONOCYTES # BLD AUTO: 1.3 K/UL (ref 0.8–1)
MONOCYTES NFR BLD AUTO: 16.3 % (ref 1.7–9.3)
NEUTROPHILS # BLD AUTO: 4.4 K/UL (ref 1.8–7.7)
NEUTROPHILS NFR BLD AUTO: 56.6 % (ref 42.2–75.2)
PHOSPHATE SERPL-MCNC: 4 MG/DL (ref 2.5–4.9)
PLATELET # BLD AUTO: 291 K/UL (ref 140–450)
POTASSIUM SERPL-SCNC: 2.9 MMOL/L (ref 3.5–5.1)
RBC # BLD AUTO: 2.29 MIL/UL (ref 4.2–6.1)
SODIUM SERPL-SCNC: 140 MMOL/L (ref 136–145)
WBC # BLD AUTO: 7.7 K/UL (ref 4.8–10.8)

## 2023-01-18 RX ADMIN — DEXTROSE SCH MLS/HR: 50 INJECTION, SOLUTION INTRAVENOUS at 05:19

## 2023-01-18 RX ADMIN — IPRATROPIUM BROMIDE AND ALBUTEROL SULFATE SCH ML: .5; 3 SOLUTION RESPIRATORY (INHALATION) at 12:40

## 2023-01-18 RX ADMIN — FUROSEMIDE SCH MG: 10 INJECTION, SOLUTION INTRAMUSCULAR; INTRAVENOUS at 21:02

## 2023-01-18 RX ADMIN — MIDODRINE HYDROCHLORIDE SCH MG: 5 TABLET ORAL at 00:45

## 2023-01-18 RX ADMIN — VANCOMYCIN HYDROCHLORIDE SCH MG: KIT at 23:38

## 2023-01-18 RX ADMIN — IPRATROPIUM BROMIDE AND ALBUTEROL SULFATE SCH ML: .5; 3 SOLUTION RESPIRATORY (INHALATION) at 06:54

## 2023-01-18 RX ADMIN — IPRATROPIUM BROMIDE AND ALBUTEROL SULFATE SCH ML: .5; 3 SOLUTION RESPIRATORY (INHALATION) at 19:48

## 2023-01-18 RX ADMIN — DEXTROSE SCH MLS/HR: 50 INJECTION, SOLUTION INTRAVENOUS at 23:34

## 2023-01-18 RX ADMIN — BENZTROPINE MESYLATE SCH MG: 1 TABLET ORAL at 08:57

## 2023-01-18 RX ADMIN — MIDODRINE HYDROCHLORIDE SCH MG: 5 TABLET ORAL at 05:19

## 2023-01-18 RX ADMIN — MIDODRINE HYDROCHLORIDE SCH MG: 5 TABLET ORAL at 12:40

## 2023-01-18 RX ADMIN — AMIODARONE HYDROCHLORIDE SCH MG: 200 TABLET ORAL at 21:03

## 2023-01-18 RX ADMIN — IPRATROPIUM BROMIDE AND ALBUTEROL SULFATE SCH ML: .5; 3 SOLUTION RESPIRATORY (INHALATION) at 01:40

## 2023-01-18 RX ADMIN — MIDODRINE HYDROCHLORIDE SCH MG: 5 TABLET ORAL at 23:41

## 2023-01-18 RX ADMIN — ACETAMINOPHEN PRN MG: 325 TABLET ORAL at 19:47

## 2023-01-18 RX ADMIN — VALPROIC ACID SCH MG: 250 SOLUTION ORAL at 21:03

## 2023-01-18 RX ADMIN — DEXTROSE SCH MLS/HR: 50 INJECTION, SOLUTION INTRAVENOUS at 17:48

## 2023-01-18 RX ADMIN — ENOXAPARIN SODIUM SCH MG: 40 INJECTION SUBCUTANEOUS at 08:58

## 2023-01-18 RX ADMIN — AMIODARONE HYDROCHLORIDE SCH MG: 200 TABLET ORAL at 08:56

## 2023-01-18 RX ADMIN — DEXTROSE SCH MLS/HR: 5 SOLUTION INTRAVENOUS at 08:57

## 2023-01-18 RX ADMIN — FUROSEMIDE SCH MG: 10 INJECTION, SOLUTION INTRAMUSCULAR; INTRAVENOUS at 09:00

## 2023-01-18 RX ADMIN — MIDODRINE HYDROCHLORIDE SCH MG: 5 TABLET ORAL at 17:47

## 2023-01-18 RX ADMIN — DEXTROSE SCH MLS/HR: 50 INJECTION, SOLUTION INTRAVENOUS at 12:40

## 2023-01-18 RX ADMIN — VANCOMYCIN HYDROCHLORIDE SCH MG: KIT at 17:47

## 2023-01-18 RX ADMIN — PANTOPRAZOLE SODIUM SCH MG: 40 TABLET, DELAYED RELEASE ORAL at 08:52

## 2023-01-18 RX ADMIN — DEXTROSE SCH MLS/HR: 5 SOLUTION INTRAVENOUS at 21:02

## 2023-01-18 RX ADMIN — BENZTROPINE MESYLATE SCH MG: 1 TABLET ORAL at 21:07

## 2023-01-18 RX ADMIN — POTASSIUM CHLORIDE PRN MLS/HR: 200 INJECTION, SOLUTION INTRAVENOUS at 08:49

## 2023-01-18 RX ADMIN — POTASSIUM CHLORIDE SCH MEQ: 40 SOLUTION ORAL at 09:00

## 2023-01-18 RX ADMIN — VALPROIC ACID SCH MG: 250 SOLUTION ORAL at 08:53

## 2023-01-18 RX ADMIN — DEXTROSE SCH MLS/HR: 50 INJECTION, SOLUTION INTRAVENOUS at 00:45

## 2023-01-18 NOTE — NUR
1/18/23 RD FOLLOW UP COMPLETED



PLEASE REFER TO NUTRITION ASSESSMENT UNDER CARE ACTIVITY FOR ESTIMATED NUTRITIONAL NEEDS. 



1.RECOMMEND INCREASING VITAL AF 1.2 TO GOAL RATE 65 ML/HR AS TOLERATED,  ML Q4H OR 
PER MD.

-AT GOAL RATE OF 65 ML/HR, THIS WILL PROVIDE 1560 ML VOLUME, 1872 KCAL, 117 GM OF PROTEIN, 
AND 2165 ML FREE WATER, MEETING 98% OF ESTIMATED KCAL NEEDS % OF ESTIMATED PROTEIN 
NEEDS; ADEQUATE.

2. MONITOR GI SYMPTOMS & GASTRIC RESIDUALS.

3. CONSULT RD PRN.

4. RD TO FOLLOW-UP 3-5 DAYS, MODERATE RISK



REVIEWED BY KIKA AMEZCUA RD

## 2023-01-18 NOTE — NUR
RECEIVED REPORT FROM KONSTANTIN WEISS FOR CONTINUITY OF THIS PT'S CARE. PT RECEIVED IN ISOLATION 
ROOM SMELLING HEAVILY OF C-DIFF. PT HAS A RECTAL TUBE IN PLACE . THE FLEX I SEAL WAS 
IRRIGATED TO ALLOW FLOW. PT HAS 2 BLUE BLANKETS ON HIM AND HIS TEMPERATURE .4. TYLENOL 
650 MG GIVEN AND ICE PAKS APPLIED.THE BLANKETS WERE REMOVED. PT REMAINS TRACHED TO VENT WITH 
SETTING AC/VC 21%FIO2, , RATE 18, AND PEEP5. HE'S TOLERATING THE VENT SETTING WELL HE 
HAS A OGT IN PLACE  WITH ONLY 10 CC RESIDUAL . HE HAS VITAL AF AT 50 CC'/HOUR AND FREE WATER 
FLUSH AT 50 CCQ8 HOUR. HE HAS A CASTILLO CATH DRAINING QUANTITY SUFFICIENT AMT OF CLEAR YELLOW 
URINE.

## 2023-01-18 NOTE — NUR
CPAP TRIAL 12/5 PT LASTED 90 MINS. VOLUMES RANGED FROM 420-550. BLOOD PRESSURE, HEART RATE 
AND RESPIRATORY RATE  ALL WITH IN NORMAL RANGE. SWITCHED TO PREVIOUS VENT SETTING, SO PT 
COULD REST. WILL CONTINUE TO MONITOR.

## 2023-01-18 NOTE — NUR
RECEIVED PT ON ,16,+5,28%. VENT PLUGGED INTO RED OUTLET, WHEELS ARE LOCKED. AMBUBAG AT 
BEDSIDE. ALARMS ARE SET AND AUDIBLE. WILL CONTINUE TO MONITOR.

-------------------------------------------------------------------------------

Addendum: 01/18/23 at 1231 by Olivia Good RT

-------------------------------------------------------------------------------

RESPIRATORY RATE IS 18 AND FIO2 IS 21%.

## 2023-01-19 VITALS — DIASTOLIC BLOOD PRESSURE: 53 MMHG | SYSTOLIC BLOOD PRESSURE: 99 MMHG

## 2023-01-19 VITALS — DIASTOLIC BLOOD PRESSURE: 55 MMHG | SYSTOLIC BLOOD PRESSURE: 98 MMHG

## 2023-01-19 VITALS — DIASTOLIC BLOOD PRESSURE: 64 MMHG | SYSTOLIC BLOOD PRESSURE: 108 MMHG

## 2023-01-19 VITALS — SYSTOLIC BLOOD PRESSURE: 115 MMHG | DIASTOLIC BLOOD PRESSURE: 69 MMHG

## 2023-01-19 VITALS — SYSTOLIC BLOOD PRESSURE: 100 MMHG | DIASTOLIC BLOOD PRESSURE: 58 MMHG

## 2023-01-19 VITALS — DIASTOLIC BLOOD PRESSURE: 60 MMHG | SYSTOLIC BLOOD PRESSURE: 108 MMHG

## 2023-01-19 VITALS — DIASTOLIC BLOOD PRESSURE: 64 MMHG | SYSTOLIC BLOOD PRESSURE: 101 MMHG

## 2023-01-19 VITALS — SYSTOLIC BLOOD PRESSURE: 118 MMHG | DIASTOLIC BLOOD PRESSURE: 67 MMHG

## 2023-01-19 VITALS — DIASTOLIC BLOOD PRESSURE: 74 MMHG | SYSTOLIC BLOOD PRESSURE: 125 MMHG

## 2023-01-19 VITALS — DIASTOLIC BLOOD PRESSURE: 63 MMHG | SYSTOLIC BLOOD PRESSURE: 103 MMHG

## 2023-01-19 VITALS — DIASTOLIC BLOOD PRESSURE: 67 MMHG | SYSTOLIC BLOOD PRESSURE: 107 MMHG

## 2023-01-19 VITALS — DIASTOLIC BLOOD PRESSURE: 66 MMHG | SYSTOLIC BLOOD PRESSURE: 112 MMHG

## 2023-01-19 VITALS — SYSTOLIC BLOOD PRESSURE: 111 MMHG | DIASTOLIC BLOOD PRESSURE: 61 MMHG

## 2023-01-19 VITALS — DIASTOLIC BLOOD PRESSURE: 66 MMHG | SYSTOLIC BLOOD PRESSURE: 114 MMHG

## 2023-01-19 VITALS — SYSTOLIC BLOOD PRESSURE: 92 MMHG | DIASTOLIC BLOOD PRESSURE: 58 MMHG

## 2023-01-19 VITALS — DIASTOLIC BLOOD PRESSURE: 67 MMHG | SYSTOLIC BLOOD PRESSURE: 104 MMHG

## 2023-01-19 VITALS — SYSTOLIC BLOOD PRESSURE: 125 MMHG | DIASTOLIC BLOOD PRESSURE: 74 MMHG

## 2023-01-19 VITALS — DIASTOLIC BLOOD PRESSURE: 60 MMHG | SYSTOLIC BLOOD PRESSURE: 102 MMHG

## 2023-01-19 VITALS — SYSTOLIC BLOOD PRESSURE: 116 MMHG | DIASTOLIC BLOOD PRESSURE: 68 MMHG

## 2023-01-19 VITALS — SYSTOLIC BLOOD PRESSURE: 96 MMHG | DIASTOLIC BLOOD PRESSURE: 52 MMHG

## 2023-01-19 VITALS — DIASTOLIC BLOOD PRESSURE: 68 MMHG | SYSTOLIC BLOOD PRESSURE: 119 MMHG

## 2023-01-19 VITALS — DIASTOLIC BLOOD PRESSURE: 58 MMHG | SYSTOLIC BLOOD PRESSURE: 104 MMHG

## 2023-01-19 VITALS — DIASTOLIC BLOOD PRESSURE: 64 MMHG | SYSTOLIC BLOOD PRESSURE: 111 MMHG

## 2023-01-19 VITALS — SYSTOLIC BLOOD PRESSURE: 127 MMHG | DIASTOLIC BLOOD PRESSURE: 76 MMHG

## 2023-01-19 VITALS — SYSTOLIC BLOOD PRESSURE: 120 MMHG | DIASTOLIC BLOOD PRESSURE: 68 MMHG

## 2023-01-19 VITALS — DIASTOLIC BLOOD PRESSURE: 68 MMHG | SYSTOLIC BLOOD PRESSURE: 118 MMHG

## 2023-01-19 VITALS — SYSTOLIC BLOOD PRESSURE: 103 MMHG | DIASTOLIC BLOOD PRESSURE: 63 MMHG

## 2023-01-19 VITALS — SYSTOLIC BLOOD PRESSURE: 126 MMHG | DIASTOLIC BLOOD PRESSURE: 73 MMHG

## 2023-01-19 VITALS — SYSTOLIC BLOOD PRESSURE: 114 MMHG | DIASTOLIC BLOOD PRESSURE: 68 MMHG

## 2023-01-19 VITALS — DIASTOLIC BLOOD PRESSURE: 60 MMHG | SYSTOLIC BLOOD PRESSURE: 107 MMHG

## 2023-01-19 VITALS — SYSTOLIC BLOOD PRESSURE: 99 MMHG | DIASTOLIC BLOOD PRESSURE: 58 MMHG

## 2023-01-19 LAB
ANION GAP SERPL CALCULATED.3IONS-SCNC: 8.3 MMOL/L (ref 8–16)
BASOPHILS # BLD AUTO: 0.1 K/UL (ref 0–0.22)
BASOPHILS NFR BLD AUTO: 1.5 % (ref 0–2)
BUN SERPL-MCNC: 18 MG/DL (ref 7–18)
CHLORIDE SERPL-SCNC: 99 MMOL/L (ref 98–107)
CO2 SERPL-SCNC: 35.7 MMOL/L (ref 21–32)
CREAT SERPL-MCNC: 1 MG/DL (ref 0.6–1.3)
EOSINOPHIL # BLD AUTO: 0.1 K/UL (ref 0–0.4)
EOSINOPHIL NFR BLD AUTO: 1.3 % (ref 0–4)
ERYTHROCYTE [DISTWIDTH] IN BLOOD BY AUTOMATED COUNT: 14.2 % (ref 11.6–13.7)
GFR SERPL CREATININE-BSD FRML MDRD: 101 ML/MIN (ref 90–?)
GLUCOSE SERPL-MCNC: 96 MG/DL (ref 74–106)
HCT VFR BLD AUTO: 25 % (ref 36–52)
HGB BLD-MCNC: 8.6 G/DL (ref 12–18)
LYMPHOCYTES # BLD AUTO: 2.4 K/UL (ref 2–11.5)
LYMPHOCYTES NFR BLD AUTO: 25.8 % (ref 20.5–51.1)
MAGNESIUM SERPL-MCNC: 2.4 MG/DL (ref 1.8–2.4)
MCH RBC QN AUTO: 34 PG (ref 27–31)
MCHC RBC AUTO-ENTMCNC: 35 G/DL (ref 33–37)
MCV RBC AUTO: 99.4 FL (ref 80–94)
MONOCYTES # BLD AUTO: 1.3 K/UL (ref 0.8–1)
MONOCYTES NFR BLD AUTO: 14.8 % (ref 1.7–9.3)
NEUTROPHILS # BLD AUTO: 5.1 K/UL (ref 1.8–7.7)
NEUTROPHILS NFR BLD AUTO: 56.6 % (ref 42.2–75.2)
PHOSPHATE SERPL-MCNC: 3.8 MG/DL (ref 2.5–4.9)
PLATELET # BLD AUTO: 365 K/UL (ref 140–450)
POTASSIUM SERPL-SCNC: 4 MMOL/L (ref 3.5–5.1)
RBC # BLD AUTO: 2.51 MIL/UL (ref 4.2–6.1)
SODIUM SERPL-SCNC: 139 MMOL/L (ref 136–145)
WBC # BLD AUTO: 9.1 K/UL (ref 4.8–10.8)

## 2023-01-19 RX ADMIN — VALPROIC ACID SCH MG: 250 SOLUTION ORAL at 20:33

## 2023-01-19 RX ADMIN — FUROSEMIDE SCH MG: 10 INJECTION, SOLUTION INTRAMUSCULAR; INTRAVENOUS at 20:32

## 2023-01-19 RX ADMIN — FUROSEMIDE SCH MG: 10 INJECTION, SOLUTION INTRAMUSCULAR; INTRAVENOUS at 09:08

## 2023-01-19 RX ADMIN — PANTOPRAZOLE SODIUM SCH MG: 40 TABLET, DELAYED RELEASE ORAL at 09:09

## 2023-01-19 RX ADMIN — AMIODARONE HYDROCHLORIDE SCH MG: 200 TABLET ORAL at 20:43

## 2023-01-19 RX ADMIN — MIDODRINE HYDROCHLORIDE SCH MG: 5 TABLET ORAL at 05:39

## 2023-01-19 RX ADMIN — IPRATROPIUM BROMIDE AND ALBUTEROL SULFATE SCH ML: .5; 3 SOLUTION RESPIRATORY (INHALATION) at 14:32

## 2023-01-19 RX ADMIN — BENZTROPINE MESYLATE SCH MG: 1 TABLET ORAL at 09:09

## 2023-01-19 RX ADMIN — POTASSIUM CHLORIDE SCH MEQ: 40 SOLUTION ORAL at 09:08

## 2023-01-19 RX ADMIN — BENZTROPINE MESYLATE SCH MG: 1 TABLET ORAL at 20:34

## 2023-01-19 RX ADMIN — ENOXAPARIN SODIUM SCH MG: 40 INJECTION SUBCUTANEOUS at 09:23

## 2023-01-19 RX ADMIN — IPRATROPIUM BROMIDE AND ALBUTEROL SULFATE SCH ML: .5; 3 SOLUTION RESPIRATORY (INHALATION) at 00:27

## 2023-01-19 RX ADMIN — AMIODARONE HYDROCHLORIDE SCH MG: 200 TABLET ORAL at 20:32

## 2023-01-19 RX ADMIN — VANCOMYCIN HYDROCHLORIDE SCH MG: KIT at 12:16

## 2023-01-19 RX ADMIN — IPRATROPIUM BROMIDE AND ALBUTEROL SULFATE SCH ML: .5; 3 SOLUTION RESPIRATORY (INHALATION) at 15:24

## 2023-01-19 RX ADMIN — DEXTROSE SCH MLS/HR: 50 INJECTION, SOLUTION INTRAVENOUS at 12:33

## 2023-01-19 RX ADMIN — MIDODRINE HYDROCHLORIDE SCH MG: 5 TABLET ORAL at 23:27

## 2023-01-19 RX ADMIN — MIDODRINE HYDROCHLORIDE SCH MG: 5 TABLET ORAL at 12:16

## 2023-01-19 RX ADMIN — VALPROIC ACID SCH MG: 250 SOLUTION ORAL at 09:09

## 2023-01-19 RX ADMIN — DEXTROSE SCH MLS/HR: 50 INJECTION, SOLUTION INTRAVENOUS at 07:00

## 2023-01-19 RX ADMIN — AMIODARONE HYDROCHLORIDE SCH MG: 200 TABLET ORAL at 09:08

## 2023-01-19 RX ADMIN — MIDODRINE HYDROCHLORIDE SCH MG: 5 TABLET ORAL at 17:57

## 2023-01-19 RX ADMIN — DEXTROSE SCH MLS/HR: 50 INJECTION, SOLUTION INTRAVENOUS at 18:08

## 2023-01-19 RX ADMIN — VANCOMYCIN HYDROCHLORIDE SCH MG: KIT at 05:23

## 2023-01-19 RX ADMIN — VANCOMYCIN HYDROCHLORIDE SCH MG: KIT at 17:57

## 2023-01-19 RX ADMIN — VANCOMYCIN HYDROCHLORIDE SCH MG: KIT at 23:26

## 2023-01-19 RX ADMIN — IPRATROPIUM BROMIDE AND ALBUTEROL SULFATE SCH ML: .5; 3 SOLUTION RESPIRATORY (INHALATION) at 23:55

## 2023-01-19 RX ADMIN — DEXTROSE SCH MLS/HR: 50 INJECTION, SOLUTION INTRAVENOUS at 23:25

## 2023-01-19 NOTE — NUR
PT'EMPERATURE HAS REMAINED WNL SINCE 2200PM. HE'S SLEEPING NOW AND HIS BLOOD PRESSURE HSN'T 
DROPPED BELOW 90 SINCE THE FIRST LOW READING AT 2000.

## 2023-01-19 NOTE — NUR
RECEIVED PATIENT AND ASSESSMENT DONE AND COMPLETED.AWAKE ,OPEN EYES BUT DOES NOT FOLLOW 
COMMANDS SR ON MONITOR,NO ECTOPY .AFEBRILE..ON VENTILATOR WITH NO CHANGES ON SETTINGS.LUNG 
SOUNDS WITH RHONCHI ON BILATERAL LUNG MOSS.ABDOMEN ROUND,SOFT AND NON TENDER WITH POSITIVE 
BS X4 QUADS.PULSES PRENT AND PALPABLE.WITH VITAL AF AT 30 ML/HR AND FEEDING TOLERATED 
WELL.NO VOMITING AND DIARRHEA NOTED.CASTILLO IN PLACE AND DRAINING ADEQUATE AMOUNT OF URINE 
OUTPUT.SKIN IS INTACT .WILL CONTINUE TO PROCEED WITH CARE.

## 2023-01-20 VITALS — DIASTOLIC BLOOD PRESSURE: 70 MMHG | SYSTOLIC BLOOD PRESSURE: 107 MMHG

## 2023-01-20 VITALS — SYSTOLIC BLOOD PRESSURE: 98 MMHG | DIASTOLIC BLOOD PRESSURE: 60 MMHG

## 2023-01-20 VITALS — SYSTOLIC BLOOD PRESSURE: 113 MMHG | DIASTOLIC BLOOD PRESSURE: 67 MMHG

## 2023-01-20 VITALS — SYSTOLIC BLOOD PRESSURE: 100 MMHG | DIASTOLIC BLOOD PRESSURE: 60 MMHG

## 2023-01-20 VITALS — DIASTOLIC BLOOD PRESSURE: 70 MMHG | SYSTOLIC BLOOD PRESSURE: 111 MMHG

## 2023-01-20 VITALS — SYSTOLIC BLOOD PRESSURE: 119 MMHG | DIASTOLIC BLOOD PRESSURE: 71 MMHG

## 2023-01-20 VITALS — DIASTOLIC BLOOD PRESSURE: 71 MMHG | SYSTOLIC BLOOD PRESSURE: 115 MMHG

## 2023-01-20 VITALS — SYSTOLIC BLOOD PRESSURE: 110 MMHG | DIASTOLIC BLOOD PRESSURE: 62 MMHG

## 2023-01-20 VITALS — SYSTOLIC BLOOD PRESSURE: 99 MMHG | DIASTOLIC BLOOD PRESSURE: 58 MMHG

## 2023-01-20 VITALS — SYSTOLIC BLOOD PRESSURE: 104 MMHG | DIASTOLIC BLOOD PRESSURE: 65 MMHG

## 2023-01-20 VITALS — SYSTOLIC BLOOD PRESSURE: 112 MMHG | DIASTOLIC BLOOD PRESSURE: 67 MMHG

## 2023-01-20 VITALS — SYSTOLIC BLOOD PRESSURE: 105 MMHG | DIASTOLIC BLOOD PRESSURE: 61 MMHG

## 2023-01-20 VITALS — DIASTOLIC BLOOD PRESSURE: 72 MMHG | SYSTOLIC BLOOD PRESSURE: 119 MMHG

## 2023-01-20 VITALS — DIASTOLIC BLOOD PRESSURE: 58 MMHG | SYSTOLIC BLOOD PRESSURE: 102 MMHG

## 2023-01-20 VITALS — SYSTOLIC BLOOD PRESSURE: 110 MMHG | DIASTOLIC BLOOD PRESSURE: 70 MMHG

## 2023-01-20 VITALS — SYSTOLIC BLOOD PRESSURE: 99 MMHG | DIASTOLIC BLOOD PRESSURE: 64 MMHG

## 2023-01-20 VITALS — SYSTOLIC BLOOD PRESSURE: 112 MMHG | DIASTOLIC BLOOD PRESSURE: 68 MMHG

## 2023-01-20 VITALS — DIASTOLIC BLOOD PRESSURE: 63 MMHG | SYSTOLIC BLOOD PRESSURE: 103 MMHG

## 2023-01-20 VITALS — SYSTOLIC BLOOD PRESSURE: 105 MMHG | DIASTOLIC BLOOD PRESSURE: 65 MMHG

## 2023-01-20 VITALS — SYSTOLIC BLOOD PRESSURE: 103 MMHG | DIASTOLIC BLOOD PRESSURE: 66 MMHG

## 2023-01-20 VITALS — SYSTOLIC BLOOD PRESSURE: 93 MMHG | DIASTOLIC BLOOD PRESSURE: 61 MMHG

## 2023-01-20 VITALS — DIASTOLIC BLOOD PRESSURE: 68 MMHG | SYSTOLIC BLOOD PRESSURE: 112 MMHG

## 2023-01-20 VITALS — DIASTOLIC BLOOD PRESSURE: 75 MMHG | SYSTOLIC BLOOD PRESSURE: 117 MMHG

## 2023-01-20 VITALS — SYSTOLIC BLOOD PRESSURE: 128 MMHG | DIASTOLIC BLOOD PRESSURE: 78 MMHG

## 2023-01-20 VITALS — DIASTOLIC BLOOD PRESSURE: 58 MMHG | SYSTOLIC BLOOD PRESSURE: 104 MMHG

## 2023-01-20 VITALS — SYSTOLIC BLOOD PRESSURE: 103 MMHG | DIASTOLIC BLOOD PRESSURE: 58 MMHG

## 2023-01-20 VITALS — SYSTOLIC BLOOD PRESSURE: 99 MMHG | DIASTOLIC BLOOD PRESSURE: 78 MMHG

## 2023-01-20 LAB
ANION GAP SERPL CALCULATED.3IONS-SCNC: 9.3 MMOL/L (ref 8–16)
BASOPHILS # BLD AUTO: 0 K/UL (ref 0–0.22)
BASOPHILS NFR BLD AUTO: 0.5 % (ref 0–2)
BUN SERPL-MCNC: 18 MG/DL (ref 7–18)
CHLORIDE SERPL-SCNC: 96 MMOL/L (ref 98–107)
CO2 SERPL-SCNC: 35.2 MMOL/L (ref 21–32)
CREAT SERPL-MCNC: 0.9 MG/DL (ref 0.6–1.3)
EOSINOPHIL # BLD AUTO: 0.1 K/UL (ref 0–0.4)
EOSINOPHIL NFR BLD AUTO: 1 % (ref 0–4)
ERYTHROCYTE [DISTWIDTH] IN BLOOD BY AUTOMATED COUNT: 14.5 % (ref 11.6–13.7)
GFR SERPL CREATININE-BSD FRML MDRD: 114 ML/MIN (ref 90–?)
GLUCOSE SERPL-MCNC: 88 MG/DL (ref 74–106)
HCT VFR BLD AUTO: 25.8 % (ref 36–52)
HGB BLD-MCNC: 8.8 G/DL (ref 12–18)
LYMPHOCYTES # BLD AUTO: 2.4 K/UL (ref 2–11.5)
LYMPHOCYTES NFR BLD AUTO: 25.8 % (ref 20.5–51.1)
MAGNESIUM SERPL-MCNC: 2.1 MG/DL (ref 1.8–2.4)
MCH RBC QN AUTO: 35 PG (ref 27–31)
MCHC RBC AUTO-ENTMCNC: 34 G/DL (ref 33–37)
MCV RBC AUTO: 100.9 FL (ref 80–94)
MONOCYTES # BLD AUTO: 1.5 K/UL (ref 0.8–1)
MONOCYTES NFR BLD AUTO: 16.8 % (ref 1.7–9.3)
NEUTROPHILS # BLD AUTO: 5.1 K/UL (ref 1.8–7.7)
NEUTROPHILS NFR BLD AUTO: 55.9 % (ref 42.2–75.2)
PHOSPHATE SERPL-MCNC: 3.6 MG/DL (ref 2.5–4.9)
PLATELET # BLD AUTO: 437 K/UL (ref 140–450)
POTASSIUM SERPL-SCNC: 3.5 MMOL/L (ref 3.5–5.1)
RBC # BLD AUTO: 2.56 MIL/UL (ref 4.2–6.1)
SODIUM SERPL-SCNC: 137 MMOL/L (ref 136–145)
WBC # BLD AUTO: 9.2 K/UL (ref 4.8–10.8)

## 2023-01-20 RX ADMIN — MIDODRINE HYDROCHLORIDE SCH MG: 5 TABLET ORAL at 18:40

## 2023-01-20 RX ADMIN — DEXTROSE SCH MLS/HR: 50 INJECTION, SOLUTION INTRAVENOUS at 12:28

## 2023-01-20 RX ADMIN — IPRATROPIUM BROMIDE AND ALBUTEROL SULFATE SCH ML: .5; 3 SOLUTION RESPIRATORY (INHALATION) at 00:12

## 2023-01-20 RX ADMIN — ALBUTEROL SULFATE SCH MG: 2.5 SOLUTION RESPIRATORY (INHALATION) at 14:42

## 2023-01-20 RX ADMIN — VANCOMYCIN HYDROCHLORIDE SCH MG: KIT at 06:09

## 2023-01-20 RX ADMIN — BENZTROPINE MESYLATE SCH MG: 1 TABLET ORAL at 20:59

## 2023-01-20 RX ADMIN — POTASSIUM CHLORIDE SCH MEQ: 40 SOLUTION ORAL at 09:11

## 2023-01-20 RX ADMIN — ACETAMINOPHEN PRN MG: 325 TABLET ORAL at 09:34

## 2023-01-20 RX ADMIN — DEXTROSE SCH MLS/HR: 50 INJECTION, SOLUTION INTRAVENOUS at 06:07

## 2023-01-20 RX ADMIN — VANCOMYCIN HYDROCHLORIDE SCH MG: KIT at 18:40

## 2023-01-20 RX ADMIN — VALPROIC ACID SCH MG: 250 SOLUTION ORAL at 09:14

## 2023-01-20 RX ADMIN — DEXTROSE SCH MLS/HR: 5 SOLUTION INTRAVENOUS at 20:56

## 2023-01-20 RX ADMIN — ALBUTEROL SULFATE SCH MG: 2.5 SOLUTION RESPIRATORY (INHALATION) at 07:33

## 2023-01-20 RX ADMIN — ENOXAPARIN SODIUM SCH MG: 40 INJECTION SUBCUTANEOUS at 09:20

## 2023-01-20 RX ADMIN — IPRATROPIUM BROMIDE AND ALBUTEROL SULFATE SCH ML: .5; 3 SOLUTION RESPIRATORY (INHALATION) at 07:37

## 2023-01-20 RX ADMIN — PANTOPRAZOLE SODIUM SCH MG: 40 TABLET, DELAYED RELEASE ORAL at 09:16

## 2023-01-20 RX ADMIN — VANCOMYCIN HYDROCHLORIDE SCH MG: KIT at 12:28

## 2023-01-20 RX ADMIN — FUROSEMIDE SCH MG: 10 INJECTION, SOLUTION INTRAMUSCULAR; INTRAVENOUS at 20:57

## 2023-01-20 RX ADMIN — MIDODRINE HYDROCHLORIDE SCH MG: 5 TABLET ORAL at 06:08

## 2023-01-20 RX ADMIN — VALPROIC ACID SCH MG: 250 SOLUTION ORAL at 20:57

## 2023-01-20 RX ADMIN — AMIODARONE HYDROCHLORIDE SCH MG: 200 TABLET ORAL at 09:27

## 2023-01-20 RX ADMIN — IPRATROPIUM BROMIDE AND ALBUTEROL SULFATE SCH ML: .5; 3 SOLUTION RESPIRATORY (INHALATION) at 07:41

## 2023-01-20 RX ADMIN — MIDODRINE HYDROCHLORIDE SCH MG: 5 TABLET ORAL at 12:29

## 2023-01-20 RX ADMIN — DEXTROSE SCH MLS/HR: 5 SOLUTION INTRAVENOUS at 09:13

## 2023-01-20 RX ADMIN — DEXTROSE SCH MLS/HR: 50 INJECTION, SOLUTION INTRAVENOUS at 18:40

## 2023-01-20 RX ADMIN — BENZTROPINE MESYLATE SCH MG: 1 TABLET ORAL at 09:15

## 2023-01-20 RX ADMIN — FUROSEMIDE SCH MG: 10 INJECTION, SOLUTION INTRAMUSCULAR; INTRAVENOUS at 09:13

## 2023-01-20 NOTE — NUR
AT 1041 PT WAS PLACED ON CPAP AT THIS TIME. PT VITALS WERE IN NORMAL LIMITS, HR 80, 98%. PT 
WAS IN NO SIGNS OF RESP DISTRESS.  RN WAS NOTIFIED.

## 2023-01-20 NOTE — NUR
[facility] AND [facility] 90-DAY BEDHOLD



ROMERO CASTRO OF THE ABOVE FACILITY CALLED UP AND ASKED ABOUT AN UPDATE ON THE PATIENT 
SINCE THE FAMILY HAS 'ASKED FOR A 90-DAY BEDHOLD (FROM THE TIME OF TRANSFER TO [place]). 
INFORMED THAT THE PATIENT NEEDS TO BE TRACHE'D AND PEG FIRST AND THEN FOR TRANSFER TO 
[facility]. HE SAID THAT THEY DO NOT ACCEPT PATIENT ON TRACHE AND PEG OR CASTILLO 
CATHETER.

## 2023-01-20 NOTE — NUR
TRACHEOSTOMY/PEG, THEN TRANSFER TO [facility]



DR. JOSE COLE CAME AND SAID TO GET TRACHE AND PERCUTANEOUS ESOPHAGOGASTROSCOPY BY 
LASHAE VU BEFORE TRANSFERRING TO [facility], SINCE THE PATIENT WAS SECOND TIME 
ON VENTILATOR. WILL CALL SISTER FOR CONSENT.

## 2023-01-20 NOTE — NUR
RECEIVED PATIENT FROM NIGHT ABHISHEK OLMEDO PT.ORALLY INTUBATED SIZE 8, AC/VC f18, 400 MLS., 
+5, 21%, OGT TUBE FEEDING AF 60 MLS/HR, FREE WATER FLUSH, FLEXISEAL INCONTINENCE MANAGEMENT, 
LEAKING. NSR ON MONITOR. HR 70s. QT INTERVAL IS ABOUT 0.40 SECONDS, AS AMIODARONE TAB WAS 
HELD BY NIGHT RN. LEFT UPPER PICC LINE. PT. NODS TO CONVERSATION. NOT RESTRAINED. 
COOPERATIVE BUT WITH HAND/FINGER CONTRACTURES.

## 2023-01-20 NOTE — NUR
ASSUMED CARE OF THIS PATIENT AND ASSESSMENT DONE AND COMPLETED .AWAKE,ABLE RESPOND TO 
ANSWERABLE QUESTIONS AT TIME.S.AFEBRILE .IN SEEMINGLY COMFORTABLE SITUATION ,FREE OF PAIN 
AND DISCOMFORT..SR ON THE MONITOR WITH HR AS 70 .VENTILATOR SETTING AS ACVC10 FIO2 21% PEEP 
5TV 400.PATENT HAS A LOT OF SECRETIONS ORALLY AND ETT.SUCTIONING RENDRERED EVERY 2 HOURS AND 
PRN WITH VITAL AF AT 60ML/HR VIA OGT .WITH WATER FLUSH  ML/HOUR EVERY 8 HOURS.RECTAL 
TUBE IN PLACE AND DRAINING LIQUID STOOL .SKIN REMAINS INTACT AND POSITIONIG  DONE.WILL 
CONTINUE TO PROCEED WITH CARE.

## 2023-01-20 NOTE — NUR
PT. HANDED OVER TO NIGHT ABHISHEK PADILLA. PT.HAD CPAP/PS OVER 4 HRS. FOR TRACHE AND PEG WITH 
CONSENT, NO SCHEDULE YET. MESSAGED LASHAE VU, THAT SISTER CONSENTED TO TRACHE AND PEG. 
THEN FOR TRANSFER TO [facility]. AC/VC NOW 21%, RATE 10, +5, 400 MLS. TV. TUBE 
FEEDING. CLEANED STOOL TWICE, FLEXISEAL LEAKS. FLEXISEAL BALLOON CHECKED, SINCE IT IS 
LEAKING, BUT THE BALLOON WAS ADEQUATE AT 45 MLS. STOPPED LEAKING WHEN THE PT. WAS TURNED ALL 
THE WAY TO THE SIDE. CHG BATH DONE, CHANGED GOWN, ORAL CARE DONE. PT. DROOLS. PICC LINE 
DRESSING FOR CHANGING. PT. NODS TO QUESTIONS LIKE SUCTIONING, FOLLOWS TO COMMAND WHEN ASKED 
TO OPEN MOUTH FOR MOUTH CARE.

## 2023-01-21 VITALS — SYSTOLIC BLOOD PRESSURE: 106 MMHG | DIASTOLIC BLOOD PRESSURE: 70 MMHG

## 2023-01-21 VITALS — DIASTOLIC BLOOD PRESSURE: 70 MMHG | SYSTOLIC BLOOD PRESSURE: 109 MMHG

## 2023-01-21 VITALS — DIASTOLIC BLOOD PRESSURE: 61 MMHG | SYSTOLIC BLOOD PRESSURE: 101 MMHG

## 2023-01-21 VITALS — DIASTOLIC BLOOD PRESSURE: 68 MMHG | SYSTOLIC BLOOD PRESSURE: 108 MMHG

## 2023-01-21 VITALS — SYSTOLIC BLOOD PRESSURE: 104 MMHG | DIASTOLIC BLOOD PRESSURE: 62 MMHG

## 2023-01-21 VITALS — DIASTOLIC BLOOD PRESSURE: 60 MMHG | SYSTOLIC BLOOD PRESSURE: 96 MMHG

## 2023-01-21 VITALS — SYSTOLIC BLOOD PRESSURE: 101 MMHG | DIASTOLIC BLOOD PRESSURE: 60 MMHG

## 2023-01-21 VITALS — DIASTOLIC BLOOD PRESSURE: 73 MMHG | SYSTOLIC BLOOD PRESSURE: 116 MMHG

## 2023-01-21 VITALS — DIASTOLIC BLOOD PRESSURE: 54 MMHG | SYSTOLIC BLOOD PRESSURE: 99 MMHG

## 2023-01-21 VITALS — DIASTOLIC BLOOD PRESSURE: 70 MMHG | SYSTOLIC BLOOD PRESSURE: 114 MMHG

## 2023-01-21 VITALS — SYSTOLIC BLOOD PRESSURE: 99 MMHG | DIASTOLIC BLOOD PRESSURE: 64 MMHG

## 2023-01-21 VITALS — SYSTOLIC BLOOD PRESSURE: 103 MMHG | DIASTOLIC BLOOD PRESSURE: 62 MMHG

## 2023-01-21 VITALS — DIASTOLIC BLOOD PRESSURE: 64 MMHG | SYSTOLIC BLOOD PRESSURE: 115 MMHG

## 2023-01-21 VITALS — SYSTOLIC BLOOD PRESSURE: 107 MMHG | DIASTOLIC BLOOD PRESSURE: 66 MMHG

## 2023-01-21 VITALS — SYSTOLIC BLOOD PRESSURE: 116 MMHG | DIASTOLIC BLOOD PRESSURE: 52 MMHG

## 2023-01-21 VITALS — DIASTOLIC BLOOD PRESSURE: 58 MMHG | SYSTOLIC BLOOD PRESSURE: 103 MMHG

## 2023-01-21 VITALS — SYSTOLIC BLOOD PRESSURE: 106 MMHG | DIASTOLIC BLOOD PRESSURE: 69 MMHG

## 2023-01-21 VITALS — DIASTOLIC BLOOD PRESSURE: 69 MMHG | SYSTOLIC BLOOD PRESSURE: 114 MMHG

## 2023-01-21 VITALS — SYSTOLIC BLOOD PRESSURE: 98 MMHG | DIASTOLIC BLOOD PRESSURE: 51 MMHG

## 2023-01-21 VITALS — SYSTOLIC BLOOD PRESSURE: 128 MMHG | DIASTOLIC BLOOD PRESSURE: 72 MMHG

## 2023-01-21 VITALS — DIASTOLIC BLOOD PRESSURE: 59 MMHG | SYSTOLIC BLOOD PRESSURE: 98 MMHG

## 2023-01-21 VITALS — SYSTOLIC BLOOD PRESSURE: 106 MMHG | DIASTOLIC BLOOD PRESSURE: 67 MMHG

## 2023-01-21 VITALS — DIASTOLIC BLOOD PRESSURE: 64 MMHG | SYSTOLIC BLOOD PRESSURE: 103 MMHG

## 2023-01-21 VITALS — SYSTOLIC BLOOD PRESSURE: 98 MMHG | DIASTOLIC BLOOD PRESSURE: 67 MMHG

## 2023-01-21 VITALS — SYSTOLIC BLOOD PRESSURE: 113 MMHG | DIASTOLIC BLOOD PRESSURE: 69 MMHG

## 2023-01-21 VITALS — DIASTOLIC BLOOD PRESSURE: 69 MMHG | SYSTOLIC BLOOD PRESSURE: 109 MMHG

## 2023-01-21 VITALS — DIASTOLIC BLOOD PRESSURE: 76 MMHG | SYSTOLIC BLOOD PRESSURE: 118 MMHG

## 2023-01-21 LAB
ANION GAP SERPL CALCULATED.3IONS-SCNC: 12.3 MMOL/L (ref 8–16)
BASOPHILS # BLD AUTO: 0 K/UL (ref 0–0.22)
BASOPHILS NFR BLD AUTO: 0.6 % (ref 0–2)
BUN SERPL-MCNC: 17 MG/DL (ref 7–18)
CHLORIDE SERPL-SCNC: 96 MMOL/L (ref 98–107)
CO2 SERPL-SCNC: 31.5 MMOL/L (ref 21–32)
CREAT SERPL-MCNC: 0.9 MG/DL (ref 0.6–1.3)
EOSINOPHIL # BLD AUTO: 0.1 K/UL (ref 0–0.4)
EOSINOPHIL NFR BLD AUTO: 0.9 % (ref 0–4)
ERYTHROCYTE [DISTWIDTH] IN BLOOD BY AUTOMATED COUNT: 14.7 % (ref 11.6–13.7)
GFR SERPL CREATININE-BSD FRML MDRD: 114 ML/MIN (ref 90–?)
GLUCOSE SERPL-MCNC: 98 MG/DL (ref 74–106)
HCT VFR BLD AUTO: 25.7 % (ref 36–52)
HGB BLD-MCNC: 8.7 G/DL (ref 12–18)
LYMPHOCYTES # BLD AUTO: 2.2 K/UL (ref 2–11.5)
LYMPHOCYTES NFR BLD AUTO: 26.2 % (ref 20.5–51.1)
MAGNESIUM SERPL-MCNC: 2.2 MG/DL (ref 1.8–2.4)
MCH RBC QN AUTO: 34 PG (ref 27–31)
MCHC RBC AUTO-ENTMCNC: 34 G/DL (ref 33–37)
MCV RBC AUTO: 101.4 FL (ref 80–94)
MONOCYTES # BLD AUTO: 1.2 K/UL (ref 0.8–1)
MONOCYTES NFR BLD AUTO: 14.7 % (ref 1.7–9.3)
NEUTROPHILS # BLD AUTO: 4.8 K/UL (ref 1.8–7.7)
NEUTROPHILS NFR BLD AUTO: 57.6 % (ref 42.2–75.2)
PHOSPHATE SERPL-MCNC: 3.9 MG/DL (ref 2.5–4.9)
PLATELET # BLD AUTO: 471 K/UL (ref 140–450)
POTASSIUM SERPL-SCNC: 3.8 MMOL/L (ref 3.5–5.1)
RBC # BLD AUTO: 2.54 MIL/UL (ref 4.2–6.1)
SODIUM SERPL-SCNC: 136 MMOL/L (ref 136–145)
WBC # BLD AUTO: 8.2 K/UL (ref 4.8–10.8)

## 2023-01-21 RX ADMIN — MIDODRINE HYDROCHLORIDE SCH MG: 5 TABLET ORAL at 17:46

## 2023-01-21 RX ADMIN — FUROSEMIDE SCH MG: 10 INJECTION, SOLUTION INTRAMUSCULAR; INTRAVENOUS at 09:26

## 2023-01-21 RX ADMIN — VANCOMYCIN HYDROCHLORIDE SCH MG: KIT at 17:45

## 2023-01-21 RX ADMIN — ALBUTEROL SULFATE SCH MG: 2.5 SOLUTION RESPIRATORY (INHALATION) at 20:08

## 2023-01-21 RX ADMIN — ALBUTEROL SULFATE SCH MG: 2.5 SOLUTION RESPIRATORY (INHALATION) at 18:50

## 2023-01-21 RX ADMIN — VALPROIC ACID SCH MG: 250 SOLUTION ORAL at 20:54

## 2023-01-21 RX ADMIN — ALBUTEROL SULFATE SCH MG: 2.5 SOLUTION RESPIRATORY (INHALATION) at 08:09

## 2023-01-21 RX ADMIN — VANCOMYCIN HYDROCHLORIDE SCH MG: KIT at 05:46

## 2023-01-21 RX ADMIN — PANTOPRAZOLE SODIUM SCH MG: 40 TABLET, DELAYED RELEASE ORAL at 09:31

## 2023-01-21 RX ADMIN — DEXTROSE SCH MLS/HR: 50 INJECTION, SOLUTION INTRAVENOUS at 17:45

## 2023-01-21 RX ADMIN — DEXTROSE SCH MLS/HR: 5 SOLUTION INTRAVENOUS at 09:25

## 2023-01-21 RX ADMIN — VANCOMYCIN HYDROCHLORIDE SCH MG: KIT at 23:40

## 2023-01-21 RX ADMIN — DEXTROSE SCH MLS/HR: 5 SOLUTION INTRAVENOUS at 20:53

## 2023-01-21 RX ADMIN — ENOXAPARIN SODIUM SCH MG: 40 INJECTION SUBCUTANEOUS at 09:29

## 2023-01-21 RX ADMIN — ALBUTEROL SULFATE SCH MG: 2.5 SOLUTION RESPIRATORY (INHALATION) at 01:00

## 2023-01-21 RX ADMIN — DEXTROSE SCH MLS/HR: 50 INJECTION, SOLUTION INTRAVENOUS at 00:28

## 2023-01-21 RX ADMIN — POTASSIUM CHLORIDE SCH MEQ: 40 SOLUTION ORAL at 09:25

## 2023-01-21 RX ADMIN — MIDODRINE HYDROCHLORIDE SCH MG: 5 TABLET ORAL at 00:29

## 2023-01-21 RX ADMIN — MIDODRINE HYDROCHLORIDE SCH MG: 5 TABLET ORAL at 23:40

## 2023-01-21 RX ADMIN — MIDODRINE HYDROCHLORIDE SCH MG: 5 TABLET ORAL at 11:46

## 2023-01-21 RX ADMIN — DEXTROSE SCH MLS/HR: 50 INJECTION, SOLUTION INTRAVENOUS at 05:44

## 2023-01-21 RX ADMIN — VANCOMYCIN HYDROCHLORIDE SCH MG: KIT at 11:45

## 2023-01-21 RX ADMIN — FUROSEMIDE SCH MG: 10 INJECTION, SOLUTION INTRAMUSCULAR; INTRAVENOUS at 20:53

## 2023-01-21 RX ADMIN — DEXTROSE SCH MLS/HR: 50 INJECTION, SOLUTION INTRAVENOUS at 23:39

## 2023-01-21 RX ADMIN — AMIODARONE HYDROCHLORIDE SCH MG: 200 TABLET ORAL at 20:54

## 2023-01-21 RX ADMIN — VALPROIC ACID SCH MG: 250 SOLUTION ORAL at 09:27

## 2023-01-21 RX ADMIN — BENZTROPINE MESYLATE SCH MG: 1 TABLET ORAL at 20:54

## 2023-01-21 RX ADMIN — VANCOMYCIN HYDROCHLORIDE SCH MG: KIT at 00:29

## 2023-01-21 RX ADMIN — AMIODARONE HYDROCHLORIDE SCH MG: 200 TABLET ORAL at 09:26

## 2023-01-21 RX ADMIN — BENZTROPINE MESYLATE SCH MG: 1 TABLET ORAL at 09:27

## 2023-01-21 RX ADMIN — DEXTROSE SCH MLS/HR: 50 INJECTION, SOLUTION INTRAVENOUS at 11:45

## 2023-01-21 RX ADMIN — MIDODRINE HYDROCHLORIDE SCH MG: 5 TABLET ORAL at 05:43

## 2023-01-21 NOTE — NUR
CPAP TRIAL at 0830, 15/5. pt was tolerating, vital signs were in normal limits, HR 63, 100%. 
pt was in no signs of respiratory distress. RN was notified. will continue to monitor.

## 2023-01-21 NOTE — NUR
RECEIVED PATIENT FROM NIGHT ABHISHEK PADILLA. PT. INTUBATED, AC/VC 21%, 400 MLS, RATE 10 +5. TUBE 
FEEDING, CASTILLO CATHETER, FLEXISEAL.

## 2023-01-21 NOTE — NUR
DR. ZURITA ([facility]) CAME



PT.IS FOR TRANSFER TO [facility] AND DO THE TRACHE AND PEG THERE, AS PER JOSE CLIFFORD'S NOTES. AWARE THAT THE PT.'S SISTER HAS GIVEN CONSENT THRU THE NIECE.

## 2023-01-21 NOTE — NUR
RECEIVED PATIENT SLEEPING AT THIS TIME.AFEBRILE.SR ON THE MONITOR ,NO ECTOPY.SEEMINGLY IN 
COMFORTABLE STATE,FREE OF PAIN AND DISCOMFORT.STILL ON VENTILATOR WITH SETTINGS AS SIMV/VC 
10 FIO2 21%  PEEP5 PS 15..LUNG SOUNNDS RHONCI.ON VITAL AF AT 60 ML/HR AND TOLERATING 
FAIRLY WELL.ABDOMEN BENIGN ,SOFT ,ROUND AND NON TENDER WITH + BS X4 QUADS.CASTILLO DRAINING 
ADEQUATE AMOUNT OF URINE.SKIN REMAINS INTACT.PICC LINE IN PLACE AND DRESSING INTACT AND  
PATENT WILL PROCEED WITH CONTINUITY OF CARE.

## 2023-01-21 NOTE — NUR
MESSAGED 



CALLED UP EXTENSION [id number], MISS CHAND'S NUMBER AND SAID PT. FOR TRANSFER TO [place] AND HAVE 
THE TRACHE & PEG DONE THERE.

## 2023-01-21 NOTE — NUR
DR. KEATING ORDERED FOR TYPE AND SCREEN. SENT MESSAGE TO DR. KEATING THAT PER DAY SHIFT NURSE REPORT 
AND NOTES, PT. IS FOR TRANSFER TO [place] AND WILL DO THE TRACH AND PEG THERE AS PER DR. LLANES. DR. KEATING STATED OK CANCEL.

## 2023-01-21 NOTE — NUR
VENT. WEANING X 1 HOUR



PT. WAS PLACED ON CPAP/PS, TOLERATED, REGULAR SUCTIONING, LARGE AMOUNT THICK MUCUS WHITE TO 
CLOUDY WHITE ORAL AND MODERATE BY ETT, THEN CHANGED TO SIMV/VC. 



PT. IS MENTALLY-CHALLENGED, ABLE TO DO SIMPLE COMMAND LIKE OPENING MOUTH, EYES. PT. SLEEPS 
MOST OF THE TIME. ABLE TO NOD.

## 2023-01-22 VITALS — SYSTOLIC BLOOD PRESSURE: 101 MMHG | DIASTOLIC BLOOD PRESSURE: 67 MMHG

## 2023-01-22 VITALS — DIASTOLIC BLOOD PRESSURE: 66 MMHG | SYSTOLIC BLOOD PRESSURE: 110 MMHG

## 2023-01-22 VITALS — DIASTOLIC BLOOD PRESSURE: 65 MMHG | SYSTOLIC BLOOD PRESSURE: 101 MMHG

## 2023-01-22 VITALS — DIASTOLIC BLOOD PRESSURE: 67 MMHG | SYSTOLIC BLOOD PRESSURE: 108 MMHG

## 2023-01-22 VITALS — DIASTOLIC BLOOD PRESSURE: 66 MMHG | SYSTOLIC BLOOD PRESSURE: 106 MMHG

## 2023-01-22 VITALS — SYSTOLIC BLOOD PRESSURE: 115 MMHG | DIASTOLIC BLOOD PRESSURE: 56 MMHG

## 2023-01-22 VITALS — DIASTOLIC BLOOD PRESSURE: 77 MMHG | SYSTOLIC BLOOD PRESSURE: 122 MMHG

## 2023-01-22 VITALS — DIASTOLIC BLOOD PRESSURE: 55 MMHG | SYSTOLIC BLOOD PRESSURE: 99 MMHG

## 2023-01-22 VITALS — DIASTOLIC BLOOD PRESSURE: 66 MMHG | SYSTOLIC BLOOD PRESSURE: 119 MMHG

## 2023-01-22 VITALS — DIASTOLIC BLOOD PRESSURE: 62 MMHG | SYSTOLIC BLOOD PRESSURE: 110 MMHG

## 2023-01-22 VITALS — DIASTOLIC BLOOD PRESSURE: 75 MMHG | SYSTOLIC BLOOD PRESSURE: 105 MMHG

## 2023-01-22 VITALS — DIASTOLIC BLOOD PRESSURE: 76 MMHG | SYSTOLIC BLOOD PRESSURE: 127 MMHG

## 2023-01-22 VITALS — DIASTOLIC BLOOD PRESSURE: 62 MMHG | SYSTOLIC BLOOD PRESSURE: 94 MMHG

## 2023-01-22 VITALS — SYSTOLIC BLOOD PRESSURE: 95 MMHG | DIASTOLIC BLOOD PRESSURE: 58 MMHG

## 2023-01-22 VITALS — DIASTOLIC BLOOD PRESSURE: 63 MMHG | SYSTOLIC BLOOD PRESSURE: 114 MMHG

## 2023-01-22 VITALS — DIASTOLIC BLOOD PRESSURE: 72 MMHG | SYSTOLIC BLOOD PRESSURE: 113 MMHG

## 2023-01-22 VITALS — DIASTOLIC BLOOD PRESSURE: 60 MMHG | SYSTOLIC BLOOD PRESSURE: 111 MMHG

## 2023-01-22 VITALS — DIASTOLIC BLOOD PRESSURE: 67 MMHG | SYSTOLIC BLOOD PRESSURE: 125 MMHG

## 2023-01-22 VITALS — DIASTOLIC BLOOD PRESSURE: 75 MMHG | SYSTOLIC BLOOD PRESSURE: 116 MMHG

## 2023-01-22 VITALS — SYSTOLIC BLOOD PRESSURE: 115 MMHG | DIASTOLIC BLOOD PRESSURE: 66 MMHG

## 2023-01-22 VITALS — SYSTOLIC BLOOD PRESSURE: 99 MMHG | DIASTOLIC BLOOD PRESSURE: 59 MMHG

## 2023-01-22 VITALS — DIASTOLIC BLOOD PRESSURE: 63 MMHG | SYSTOLIC BLOOD PRESSURE: 94 MMHG

## 2023-01-22 VITALS — SYSTOLIC BLOOD PRESSURE: 94 MMHG | DIASTOLIC BLOOD PRESSURE: 61 MMHG

## 2023-01-22 VITALS — SYSTOLIC BLOOD PRESSURE: 114 MMHG | DIASTOLIC BLOOD PRESSURE: 67 MMHG

## 2023-01-22 VITALS — SYSTOLIC BLOOD PRESSURE: 101 MMHG | DIASTOLIC BLOOD PRESSURE: 65 MMHG

## 2023-01-22 VITALS — SYSTOLIC BLOOD PRESSURE: 98 MMHG | DIASTOLIC BLOOD PRESSURE: 65 MMHG

## 2023-01-22 VITALS — DIASTOLIC BLOOD PRESSURE: 67 MMHG | SYSTOLIC BLOOD PRESSURE: 114 MMHG

## 2023-01-22 VITALS — SYSTOLIC BLOOD PRESSURE: 117 MMHG | DIASTOLIC BLOOD PRESSURE: 73 MMHG

## 2023-01-22 VITALS — SYSTOLIC BLOOD PRESSURE: 116 MMHG | DIASTOLIC BLOOD PRESSURE: 73 MMHG

## 2023-01-22 LAB
ANION GAP SERPL CALCULATED.3IONS-SCNC: 10.1 MMOL/L (ref 8–16)
BASOPHILS # BLD AUTO: 0 K/UL (ref 0–0.22)
BASOPHILS NFR BLD AUTO: 0.4 % (ref 0–2)
BUN SERPL-MCNC: 20 MG/DL (ref 7–18)
CHLORIDE SERPL-SCNC: 96 MMOL/L (ref 98–107)
CO2 SERPL-SCNC: 36.7 MMOL/L (ref 21–32)
CREAT SERPL-MCNC: 1 MG/DL (ref 0.6–1.3)
EOSINOPHIL # BLD AUTO: 0.1 K/UL (ref 0–0.4)
EOSINOPHIL NFR BLD AUTO: 0.6 % (ref 0–4)
ERYTHROCYTE [DISTWIDTH] IN BLOOD BY AUTOMATED COUNT: 15 % (ref 11.6–13.7)
GFR SERPL CREATININE-BSD FRML MDRD: 101 ML/MIN (ref 90–?)
GLUCOSE SERPL-MCNC: 98 MG/DL (ref 74–106)
HCT VFR BLD AUTO: 26.4 % (ref 36–52)
HGB BLD-MCNC: 8.8 G/DL (ref 12–18)
LYMPHOCYTES # BLD AUTO: 2 K/UL (ref 2–11.5)
LYMPHOCYTES NFR BLD AUTO: 17.8 % (ref 20.5–51.1)
MCH RBC QN AUTO: 34 PG (ref 27–31)
MCHC RBC AUTO-ENTMCNC: 33 G/DL (ref 33–37)
MCV RBC AUTO: 101.7 FL (ref 80–94)
MONOCYTES # BLD AUTO: 1.8 K/UL (ref 0.8–1)
MONOCYTES NFR BLD AUTO: 15.8 % (ref 1.7–9.3)
NEUTROPHILS # BLD AUTO: 7.3 K/UL (ref 1.8–7.7)
NEUTROPHILS NFR BLD AUTO: 65.4 % (ref 42.2–75.2)
PLATELET # BLD AUTO: 567 K/UL (ref 140–450)
POTASSIUM SERPL-SCNC: 3.8 MMOL/L (ref 3.5–5.1)
RBC # BLD AUTO: 2.59 MIL/UL (ref 4.2–6.1)
SODIUM SERPL-SCNC: 139 MMOL/L (ref 136–145)
WBC # BLD AUTO: 11.1 K/UL (ref 4.8–10.8)

## 2023-01-22 RX ADMIN — FUROSEMIDE SCH MG: 10 INJECTION, SOLUTION INTRAMUSCULAR; INTRAVENOUS at 21:37

## 2023-01-22 RX ADMIN — ALBUTEROL SULFATE SCH MG: 2.5 SOLUTION RESPIRATORY (INHALATION) at 01:00

## 2023-01-22 RX ADMIN — DEXTROSE SCH MLS/HR: 50 INJECTION, SOLUTION INTRAVENOUS at 18:27

## 2023-01-22 RX ADMIN — ALBUTEROL SULFATE SCH MG: 2.5 SOLUTION RESPIRATORY (INHALATION) at 11:06

## 2023-01-22 RX ADMIN — ALBUTEROL SULFATE SCH MG: 2.5 SOLUTION RESPIRATORY (INHALATION) at 15:59

## 2023-01-22 RX ADMIN — VALPROIC ACID SCH MG: 250 SOLUTION ORAL at 21:34

## 2023-01-22 RX ADMIN — DEXTROSE SCH MLS/HR: 50 INJECTION, SOLUTION INTRAVENOUS at 06:14

## 2023-01-22 RX ADMIN — ACETAMINOPHEN PRN MG: 325 TABLET ORAL at 09:44

## 2023-01-22 RX ADMIN — MIDODRINE HYDROCHLORIDE SCH MG: 5 TABLET ORAL at 18:27

## 2023-01-22 RX ADMIN — MIDODRINE HYDROCHLORIDE SCH MG: 5 TABLET ORAL at 06:15

## 2023-01-22 RX ADMIN — ALBUTEROL SULFATE SCH MG: 2.5 SOLUTION RESPIRATORY (INHALATION) at 20:05

## 2023-01-22 RX ADMIN — DEXTROSE SCH MLS/HR: 5 SOLUTION INTRAVENOUS at 21:33

## 2023-01-22 RX ADMIN — AMIODARONE HYDROCHLORIDE SCH MG: 200 TABLET ORAL at 09:37

## 2023-01-22 RX ADMIN — DEXTROSE SCH MLS/HR: 5 SOLUTION INTRAVENOUS at 10:57

## 2023-01-22 RX ADMIN — VANCOMYCIN HYDROCHLORIDE SCH MG: KIT at 18:27

## 2023-01-22 RX ADMIN — VANCOMYCIN HYDROCHLORIDE SCH MG: KIT at 06:14

## 2023-01-22 RX ADMIN — MIDODRINE HYDROCHLORIDE SCH MG: 5 TABLET ORAL at 12:58

## 2023-01-22 RX ADMIN — VALPROIC ACID SCH MG: 250 SOLUTION ORAL at 09:38

## 2023-01-22 RX ADMIN — FUROSEMIDE SCH MG: 10 INJECTION, SOLUTION INTRAMUSCULAR; INTRAVENOUS at 09:37

## 2023-01-22 RX ADMIN — BENZTROPINE MESYLATE SCH MG: 1 TABLET ORAL at 21:35

## 2023-01-22 RX ADMIN — ENOXAPARIN SODIUM SCH MG: 40 INJECTION SUBCUTANEOUS at 09:42

## 2023-01-22 RX ADMIN — POTASSIUM CHLORIDE SCH MEQ: 40 SOLUTION ORAL at 09:36

## 2023-01-22 RX ADMIN — AMIODARONE HYDROCHLORIDE SCH MG: 200 TABLET ORAL at 21:37

## 2023-01-22 RX ADMIN — VANCOMYCIN HYDROCHLORIDE SCH MG: KIT at 12:58

## 2023-01-22 RX ADMIN — SODIUM CHLORIDE PRN MG: 9 INJECTION, SOLUTION INTRAVENOUS at 10:26

## 2023-01-22 RX ADMIN — DEXTROSE SCH MLS/HR: 50 INJECTION, SOLUTION INTRAVENOUS at 12:58

## 2023-01-22 RX ADMIN — PANTOPRAZOLE SODIUM SCH MG: 40 TABLET, DELAYED RELEASE ORAL at 09:40

## 2023-01-22 RX ADMIN — BENZTROPINE MESYLATE SCH MG: 1 TABLET ORAL at 09:40

## 2023-01-22 NOTE — NUR
DR [id number] THE ANESTHESIOLOGIST CALLED FOR LABS AND HISTORY ON PT. HE ORDERED A CBC. BNP AND A 2 
D ECHO CARDIOGRAM FOR THE AM. ORDERS PLACED.

## 2023-01-22 NOTE — NUR
PATIENT HANDED OVER TO NIGHT RN JACKIE. PT.NPO POST MIDNIGHT AS PER DR. KEATING, FOR TRACHE AND 
PEG. TYPE AND SCREEN. FOR FOLLOW UP TRANSFER TO [place] WITH  TOMORROW. PT. ON 
SIMV/VC, PT.CLEANED TWICE FOR FLEXISEAL LEAKING. PT. IS ROUSABLE.

## 2023-01-22 NOTE — NUR
ROMERO SHAVER FROM [facility] CALLED ABOUT THE PATIENT, AND ASKED ABOUT 
HOW THE PATIENT IS DOING AND THE PLAN. INFORMED THAT THE PATIENT IS STILL ON THE VENTILATOR 
AND THE PLAN IS FOR TRANSFER TO [place] [facility] AND TRACHE & PEG THERE. 



MR. SHAVER ASKED FOR THE [facility] , GIVEN MANNIE'S EXTENSION 
NUMBER [id number].

## 2023-01-22 NOTE — NUR
RECEIVED REPORT FROM CHEPE WEISS FOR CONTINUITY OF THIS PATIENT. PT I S LYING IN A SUPINE 
POSITION WITH HIS HEAD ELEVATED TO 30 DEGREES. HE APPEARS WITHOUT DISTRESS. HE'S ETT TO VENT 
WITH SETTING SIMV/VCFIO2 21%, , RATE 10 AND 5 PEEP. MINIMAL SECRETION. LUNG SOUNDS ARE 
CLEAR TO AUSCULTATION HE HAS A OJT IN PLACED RECEIVING VITAL AF AT 50 CC/H AND FREE WATER 
150 CC Q8H PT IS POSITIVE FOR C-DIFF AND HAS A RECTAL TUBE IN PLACE FOR LIQUID STOOLS HE HAS 
A CASTILLO CATH AND THE URINE IS CLEAR YELLOW. PT IS DUE TO HAVE SURGERY TOMORROW FOR A PEG/ 
TRACH. HIS SISTER CONSENTED AND TWO RN'S SIGNED THE CONSENT.

## 2023-01-22 NOTE — NUR
RECEIVED PATIENT FROM NIGHT ABHISHEK PADILLA. PT. ORALLY INTUBATED. SIMV/VC 21%, 400 MLS, f10, 
+5. TUBE FEEDING, CASTILLO CATHETER, FLEXISEAL FOR INCONTINENCE. FOR TRANSFER OUT TO [facility] AND TRACHE AND PEG THERE.

## 2023-01-22 NOTE — NUR
LASHAE VU (SURGEON) CAME



NOTED H/H 8.8/26.4. MD ORDERED TO KEEP NPO POST MIDNIGHT FOR NOW, AND TYPE AND SCREEN, NO 
COAGULATION.

## 2023-01-22 NOTE — NUR
DR. ZURITA ([facility]) CAME NOTED TEMP.SPIKE 100F, NOW 99F. NOTED PT.NODS, MOST OF THE TIME 
SLEEPING, LETHARGIC. OBEYS TO COMMANDS, OPENS EYES & OPENS MOUTH. 



INFORMED THAT I LEFT MESSAGE THRU VOICEMAIL ON  MANN[facility], ABOUT [facility] 
TRANSFER. 



CONTINUE TREATMENT. NO ORDERS.

## 2023-01-22 NOTE — NUR
DR. GARVIN CAME, NOTED TEMP. 100F AXILLARY. FOR [facility] TRANSFER, LEFT A MESSAGE FOR 
[facility],  YESTERDAY. NO ORDERS. CONTINUE TO MONITOR TEMP.

## 2023-01-23 VITALS — SYSTOLIC BLOOD PRESSURE: 103 MMHG | DIASTOLIC BLOOD PRESSURE: 60 MMHG

## 2023-01-23 VITALS — SYSTOLIC BLOOD PRESSURE: 101 MMHG | DIASTOLIC BLOOD PRESSURE: 60 MMHG

## 2023-01-23 VITALS — DIASTOLIC BLOOD PRESSURE: 62 MMHG | SYSTOLIC BLOOD PRESSURE: 126 MMHG

## 2023-01-23 VITALS — SYSTOLIC BLOOD PRESSURE: 122 MMHG | DIASTOLIC BLOOD PRESSURE: 75 MMHG

## 2023-01-23 VITALS — DIASTOLIC BLOOD PRESSURE: 65 MMHG | SYSTOLIC BLOOD PRESSURE: 113 MMHG

## 2023-01-23 VITALS — DIASTOLIC BLOOD PRESSURE: 65 MMHG | SYSTOLIC BLOOD PRESSURE: 111 MMHG

## 2023-01-23 VITALS — DIASTOLIC BLOOD PRESSURE: 72 MMHG | SYSTOLIC BLOOD PRESSURE: 115 MMHG

## 2023-01-23 VITALS — SYSTOLIC BLOOD PRESSURE: 100 MMHG | DIASTOLIC BLOOD PRESSURE: 61 MMHG

## 2023-01-23 VITALS — DIASTOLIC BLOOD PRESSURE: 63 MMHG | SYSTOLIC BLOOD PRESSURE: 123 MMHG

## 2023-01-23 VITALS — SYSTOLIC BLOOD PRESSURE: 120 MMHG | DIASTOLIC BLOOD PRESSURE: 64 MMHG

## 2023-01-23 VITALS — DIASTOLIC BLOOD PRESSURE: 64 MMHG | SYSTOLIC BLOOD PRESSURE: 120 MMHG

## 2023-01-23 VITALS — SYSTOLIC BLOOD PRESSURE: 113 MMHG | DIASTOLIC BLOOD PRESSURE: 78 MMHG

## 2023-01-23 VITALS — DIASTOLIC BLOOD PRESSURE: 60 MMHG | SYSTOLIC BLOOD PRESSURE: 102 MMHG

## 2023-01-23 VITALS — SYSTOLIC BLOOD PRESSURE: 113 MMHG | DIASTOLIC BLOOD PRESSURE: 65 MMHG

## 2023-01-23 VITALS — SYSTOLIC BLOOD PRESSURE: 108 MMHG | DIASTOLIC BLOOD PRESSURE: 68 MMHG

## 2023-01-23 VITALS — DIASTOLIC BLOOD PRESSURE: 66 MMHG | SYSTOLIC BLOOD PRESSURE: 116 MMHG

## 2023-01-23 VITALS — SYSTOLIC BLOOD PRESSURE: 109 MMHG | DIASTOLIC BLOOD PRESSURE: 68 MMHG

## 2023-01-23 VITALS — SYSTOLIC BLOOD PRESSURE: 112 MMHG | DIASTOLIC BLOOD PRESSURE: 63 MMHG

## 2023-01-23 VITALS — SYSTOLIC BLOOD PRESSURE: 111 MMHG | DIASTOLIC BLOOD PRESSURE: 66 MMHG

## 2023-01-23 VITALS — SYSTOLIC BLOOD PRESSURE: 109 MMHG | DIASTOLIC BLOOD PRESSURE: 70 MMHG

## 2023-01-23 VITALS — DIASTOLIC BLOOD PRESSURE: 68 MMHG | SYSTOLIC BLOOD PRESSURE: 113 MMHG

## 2023-01-23 VITALS — DIASTOLIC BLOOD PRESSURE: 69 MMHG | SYSTOLIC BLOOD PRESSURE: 111 MMHG

## 2023-01-23 VITALS — DIASTOLIC BLOOD PRESSURE: 67 MMHG | SYSTOLIC BLOOD PRESSURE: 99 MMHG

## 2023-01-23 VITALS — SYSTOLIC BLOOD PRESSURE: 105 MMHG | DIASTOLIC BLOOD PRESSURE: 65 MMHG

## 2023-01-23 VITALS — SYSTOLIC BLOOD PRESSURE: 99 MMHG | DIASTOLIC BLOOD PRESSURE: 61 MMHG

## 2023-01-23 VITALS — DIASTOLIC BLOOD PRESSURE: 88 MMHG | SYSTOLIC BLOOD PRESSURE: 108 MMHG

## 2023-01-23 LAB
ANION GAP SERPL CALCULATED.3IONS-SCNC: 6.5 MMOL/L (ref 8–16)
BASOPHILS # BLD AUTO: 0 K/UL (ref 0–0.22)
BASOPHILS NFR BLD AUTO: 0.5 % (ref 0–2)
BUN SERPL-MCNC: 17 MG/DL (ref 7–18)
CHLORIDE SERPL-SCNC: 95 MMOL/L (ref 98–107)
CO2 SERPL-SCNC: 35.8 MMOL/L (ref 21–32)
CREAT SERPL-MCNC: 0.9 MG/DL (ref 0.6–1.3)
EOSINOPHIL # BLD AUTO: 0.1 K/UL (ref 0–0.4)
EOSINOPHIL NFR BLD AUTO: 0.9 % (ref 0–4)
ERYTHROCYTE [DISTWIDTH] IN BLOOD BY AUTOMATED COUNT: 14.9 % (ref 11.6–13.7)
GFR SERPL CREATININE-BSD FRML MDRD: 114 ML/MIN (ref 90–?)
GLUCOSE SERPL-MCNC: 94 MG/DL (ref 74–106)
HCT VFR BLD AUTO: 23.6 % (ref 36–52)
HGB BLD-MCNC: 8.1 G/DL (ref 12–18)
LYMPHOCYTES # BLD AUTO: 2 K/UL (ref 2–11.5)
LYMPHOCYTES NFR BLD AUTO: 22.6 % (ref 20.5–51.1)
MCH RBC QN AUTO: 35 PG (ref 27–31)
MCHC RBC AUTO-ENTMCNC: 34 G/DL (ref 33–37)
MCV RBC AUTO: 101.4 FL (ref 80–94)
MONOCYTES # BLD AUTO: 1.4 K/UL (ref 0.8–1)
MONOCYTES NFR BLD AUTO: 16 % (ref 1.7–9.3)
NEUTROPHILS # BLD AUTO: 5.3 K/UL (ref 1.8–7.7)
NEUTROPHILS NFR BLD AUTO: 60 % (ref 42.2–75.2)
PLATELET # BLD AUTO: 512 K/UL (ref 140–450)
POTASSIUM SERPL-SCNC: 3.3 MMOL/L (ref 3.5–5.1)
RBC # BLD AUTO: 2.33 MIL/UL (ref 4.2–6.1)
SODIUM SERPL-SCNC: 134 MMOL/L (ref 136–145)
WBC # BLD AUTO: 8.8 K/UL (ref 4.8–10.8)

## 2023-01-23 RX ADMIN — POTASSIUM CHLORIDE SCH MEQ: 40 SOLUTION ORAL at 08:31

## 2023-01-23 RX ADMIN — MIDODRINE HYDROCHLORIDE SCH MG: 5 TABLET ORAL at 23:48

## 2023-01-23 RX ADMIN — ENOXAPARIN SODIUM SCH MG: 40 INJECTION SUBCUTANEOUS at 09:00

## 2023-01-23 RX ADMIN — Medication SCH EA: at 06:16

## 2023-01-23 RX ADMIN — VALPROIC ACID SCH MG: 250 SOLUTION ORAL at 08:57

## 2023-01-23 RX ADMIN — DEXTROSE SCH MLS/HR: 50 INJECTION, SOLUTION INTRAVENOUS at 12:00

## 2023-01-23 RX ADMIN — DEXTROSE SCH MLS/HR: 50 INJECTION, SOLUTION INTRAVENOUS at 06:16

## 2023-01-23 RX ADMIN — ENOXAPARIN SODIUM SCH MG: 40 INJECTION SUBCUTANEOUS at 08:32

## 2023-01-23 RX ADMIN — AMIODARONE HYDROCHLORIDE SCH MG: 200 TABLET ORAL at 08:31

## 2023-01-23 RX ADMIN — BENZTROPINE MESYLATE SCH MG: 1 TABLET ORAL at 08:30

## 2023-01-23 RX ADMIN — MIDODRINE HYDROCHLORIDE SCH MG: 5 TABLET ORAL at 06:16

## 2023-01-23 RX ADMIN — ALBUTEROL SULFATE SCH MG: 2.5 SOLUTION RESPIRATORY (INHALATION) at 16:34

## 2023-01-23 RX ADMIN — MIDODRINE HYDROCHLORIDE SCH MG: 5 TABLET ORAL at 00:17

## 2023-01-23 RX ADMIN — ALBUTEROL SULFATE SCH MG: 2.5 SOLUTION RESPIRATORY (INHALATION) at 15:27

## 2023-01-23 RX ADMIN — ALBUTEROL SULFATE SCH MG: 2.5 SOLUTION RESPIRATORY (INHALATION) at 01:55

## 2023-01-23 RX ADMIN — VALPROIC ACID SCH MG: 250 SOLUTION ORAL at 20:57

## 2023-01-23 RX ADMIN — POTASSIUM CHLORIDE PRN MEQ: 40 SOLUTION ORAL at 07:01

## 2023-01-23 RX ADMIN — DEXTROSE SCH MLS/HR: 50 INJECTION, SOLUTION INTRAVENOUS at 23:47

## 2023-01-23 RX ADMIN — MIDODRINE HYDROCHLORIDE SCH MG: 5 TABLET ORAL at 12:00

## 2023-01-23 RX ADMIN — MIDODRINE HYDROCHLORIDE SCH MG: 5 TABLET ORAL at 18:31

## 2023-01-23 RX ADMIN — FUROSEMIDE SCH MG: 10 INJECTION, SOLUTION INTRAMUSCULAR; INTRAVENOUS at 08:32

## 2023-01-23 RX ADMIN — AMIODARONE HYDROCHLORIDE SCH MG: 200 TABLET ORAL at 20:56

## 2023-01-23 RX ADMIN — VANCOMYCIN HYDROCHLORIDE SCH MG: KIT at 18:30

## 2023-01-23 RX ADMIN — DEXTROSE SCH MLS/HR: 5 SOLUTION INTRAVENOUS at 08:57

## 2023-01-23 RX ADMIN — ENOXAPARIN SODIUM SCH MG: 40 INJECTION SUBCUTANEOUS at 09:17

## 2023-01-23 RX ADMIN — DEXTROSE SCH MLS/HR: 50 INJECTION, SOLUTION INTRAVENOUS at 18:30

## 2023-01-23 RX ADMIN — DEXTROSE SCH MLS/HR: 50 INJECTION, SOLUTION INTRAVENOUS at 00:17

## 2023-01-23 RX ADMIN — VANCOMYCIN HYDROCHLORIDE SCH MG: KIT at 23:48

## 2023-01-23 RX ADMIN — BENZTROPINE MESYLATE SCH MG: 1 TABLET ORAL at 20:57

## 2023-01-23 RX ADMIN — VANCOMYCIN HYDROCHLORIDE SCH MG: KIT at 00:18

## 2023-01-23 RX ADMIN — FUROSEMIDE SCH MG: 10 INJECTION, SOLUTION INTRAMUSCULAR; INTRAVENOUS at 20:56

## 2023-01-23 RX ADMIN — PANTOPRAZOLE SODIUM SCH MG: 40 TABLET, DELAYED RELEASE ORAL at 08:31

## 2023-01-23 RX ADMIN — DEXTROSE SCH MLS/HR: 5 SOLUTION INTRAVENOUS at 20:55

## 2023-01-23 RX ADMIN — VANCOMYCIN HYDROCHLORIDE SCH MG: KIT at 06:15

## 2023-01-23 RX ADMIN — VANCOMYCIN HYDROCHLORIDE SCH MG: KIT at 12:24

## 2023-01-23 NOTE — NUR
DR KEATING MADE AWARE THAT THIS WRITER AND THE CHARGE ATTEMPTED TO CONTACT FAMILY MEMBER 
DESIGNATED TO SIGN CONSENT BUT TO NO AVALI..DR KEATING JUST SAID ;THANK YOU.

## 2023-01-23 NOTE — NUR
AASSUMED CARE OF THIS PATIENT, OPEN EYES,AND NODS TO ANSWERABLE QUESTIONS YES AND 
NO.AFEBRILE.SEEMINGLY FREE OF PAIN AND DISCOMFORT.SR ON THE MONITOR WITH NO ECTOPY.ON VENT 
SETTINGS AS SIMVVC 10 FIO2 21% PEEP5  PS 15.LUNG SOUNDS WITH RHONCHI THROUGHOUT.ABDOMEN 
BENIGN WITH + BS X4 QUADS.ON TUBE FEEDING OF VITAL AF AT 60ML/HR.RECTAL BAG IN PLACE AND 
STILL LEAKING DESPITE FLEXISEAL REPLACED TODAY.CASTILLO IS INTACT AND IN PLACE DRAINING 
ADEQUATE AMOUNT OF URINE.PICC LINE WITH DRESSING AND BENIGN..PULSES PRESENT AND 
PALPABLE.SKIN REMAINS INTACTWILL CONTINUE TO MONITOR.

## 2023-01-23 NOTE — NUR
RECEIVED PHONE CALL FROM DR KNUTSON. UPDATED PT INFORMATION. DR KNUTSON WILL DO TRACH AND PEG 
SURGERY ON 1/24/23. TO RESTART FEEDING NOW AND NPO AFTER MIDNIGHT. CHECK K+, PT, INR, APTT 
ON 1/24/23 AM.

## 2023-01-23 NOTE — NUR
1/23/23 RD FOLLOW UP COMPLETED



PLEASE REFER TO NUTRITION ASSESSMENT UNDER CARE ACTIVITY FOR ESTIMATED NUTRITIONAL NEEDS. 



1. RECOMMEND CONTINUE NPO PER MD 

2. WHEN/IF MEDICALLY APPROPRIATE, RECOMMEND VITAL AF 1.2 @ GOAL RATE 65 ML/HR AS TOLERATED, 
 ML Q8H PER MD.

- THIS WILL PROVIDE 1560 ML VOLUME, 1872 KCAL, 117 GM OF PROTEIN, AND 1715 ML FREE WATER, 
MEETING 98% OF ESTIMATED KCAL NEEDS % OF ESTIMATED PROTEIN NEEDS; ADEQUATE.

3. MONITOR GI SYMPTOMS & GASTRIC RESIDUALS.

4. CONSULT RD PRN.

5. RD TO FOLLOW-UP 2-3 DAYS, HIGH RISK



REVIEWED BY KIKA AMEZCUA RD

## 2023-01-23 NOTE — NUR
RECEIVED BESIDE REPORT FROM NIGHT SHIFT JACKIE WEISS. PT ETT TO VENT, SIMV VC FIO2 21%, VT 
400, RR 10, PEEP 5. SR ON MONITOR. NPO AFTER MIDNIGHT DUE TO SCHEDULED SURGERY. PICC LINE TO 
TEJA, RUNNING NS @5 MLS/HR. CASTILLO IN PLACE TO GRAVITY. RECTAL TUBE IN PLACE. BILAT SOFT WRIST 
RESTRAINT. SKIN SEE WOUND ASSESSMENT. BED TO LOWEST POSITION, CALL LIGHT WITHIN REACH, WILL 
CONTINUE TO MONITOR. 

-------------------------------------------------------------------------------

Addendum: 01/23/23 at 0956 by Noah Cuellar RN

-------------------------------------------------------------------------------

RECEIVED BESIDE REPORT FROM NIGHT SHIFT MYKEL WEISS. PT ETT TO VENT, SIMV VC FIO2 21%, VT 
400, RR 10, PEEP 5. SR ON MONITOR. NPO AFTER MIDNIGHT DUE TO SCHEDULED SURGERY. PICC LINE TO 
TEJA, RUNNING NS @ 5 MLS/HR. CASTILLO IN PLACE TO GRAVITY. RECTAL TUBE IN PLACE. BED TO LOWEST 
POSITION, CALL LIGHT WITHIN REACH, WILL CONTINUE TO MONITOR.

## 2023-01-24 VITALS — SYSTOLIC BLOOD PRESSURE: 107 MMHG | DIASTOLIC BLOOD PRESSURE: 66 MMHG

## 2023-01-24 VITALS — DIASTOLIC BLOOD PRESSURE: 62 MMHG | SYSTOLIC BLOOD PRESSURE: 98 MMHG

## 2023-01-24 VITALS — DIASTOLIC BLOOD PRESSURE: 72 MMHG | SYSTOLIC BLOOD PRESSURE: 111 MMHG

## 2023-01-24 VITALS — SYSTOLIC BLOOD PRESSURE: 106 MMHG | DIASTOLIC BLOOD PRESSURE: 67 MMHG

## 2023-01-24 VITALS — DIASTOLIC BLOOD PRESSURE: 63 MMHG | SYSTOLIC BLOOD PRESSURE: 103 MMHG

## 2023-01-24 VITALS — SYSTOLIC BLOOD PRESSURE: 112 MMHG | DIASTOLIC BLOOD PRESSURE: 66 MMHG

## 2023-01-24 VITALS — SYSTOLIC BLOOD PRESSURE: 116 MMHG | DIASTOLIC BLOOD PRESSURE: 71 MMHG

## 2023-01-24 VITALS — DIASTOLIC BLOOD PRESSURE: 63 MMHG | SYSTOLIC BLOOD PRESSURE: 95 MMHG

## 2023-01-24 VITALS — SYSTOLIC BLOOD PRESSURE: 113 MMHG | DIASTOLIC BLOOD PRESSURE: 66 MMHG

## 2023-01-24 VITALS — DIASTOLIC BLOOD PRESSURE: 67 MMHG | SYSTOLIC BLOOD PRESSURE: 107 MMHG

## 2023-01-24 VITALS — SYSTOLIC BLOOD PRESSURE: 96 MMHG | DIASTOLIC BLOOD PRESSURE: 62 MMHG

## 2023-01-24 VITALS — SYSTOLIC BLOOD PRESSURE: 116 MMHG | DIASTOLIC BLOOD PRESSURE: 69 MMHG

## 2023-01-24 VITALS — SYSTOLIC BLOOD PRESSURE: 96 MMHG | DIASTOLIC BLOOD PRESSURE: 60 MMHG

## 2023-01-24 VITALS — SYSTOLIC BLOOD PRESSURE: 103 MMHG | DIASTOLIC BLOOD PRESSURE: 60 MMHG

## 2023-01-24 VITALS — DIASTOLIC BLOOD PRESSURE: 67 MMHG | SYSTOLIC BLOOD PRESSURE: 101 MMHG

## 2023-01-24 VITALS — SYSTOLIC BLOOD PRESSURE: 100 MMHG | DIASTOLIC BLOOD PRESSURE: 65 MMHG

## 2023-01-24 VITALS — SYSTOLIC BLOOD PRESSURE: 105 MMHG | DIASTOLIC BLOOD PRESSURE: 69 MMHG

## 2023-01-24 VITALS — SYSTOLIC BLOOD PRESSURE: 97 MMHG | DIASTOLIC BLOOD PRESSURE: 60 MMHG

## 2023-01-24 VITALS — DIASTOLIC BLOOD PRESSURE: 65 MMHG | SYSTOLIC BLOOD PRESSURE: 102 MMHG

## 2023-01-24 VITALS — DIASTOLIC BLOOD PRESSURE: 53 MMHG | SYSTOLIC BLOOD PRESSURE: 96 MMHG

## 2023-01-24 VITALS — SYSTOLIC BLOOD PRESSURE: 106 MMHG | DIASTOLIC BLOOD PRESSURE: 68 MMHG

## 2023-01-24 VITALS — DIASTOLIC BLOOD PRESSURE: 59 MMHG | SYSTOLIC BLOOD PRESSURE: 95 MMHG

## 2023-01-24 VITALS — DIASTOLIC BLOOD PRESSURE: 67 MMHG | SYSTOLIC BLOOD PRESSURE: 105 MMHG

## 2023-01-24 VITALS — SYSTOLIC BLOOD PRESSURE: 102 MMHG | DIASTOLIC BLOOD PRESSURE: 65 MMHG

## 2023-01-24 LAB
ANION GAP SERPL CALCULATED.3IONS-SCNC: 9.3 MMOL/L (ref 8–16)
BASOPHILS # BLD AUTO: 0.1 K/UL (ref 0–0.22)
BASOPHILS NFR BLD AUTO: 1.3 % (ref 0–2)
BUN SERPL-MCNC: 18 MG/DL (ref 7–18)
CHLORIDE SERPL-SCNC: 95 MMOL/L (ref 98–107)
CO2 SERPL-SCNC: 36.1 MMOL/L (ref 21–32)
CREAT SERPL-MCNC: 1.1 MG/DL (ref 0.6–1.3)
EOSINOPHIL # BLD AUTO: 0.1 K/UL (ref 0–0.4)
EOSINOPHIL NFR BLD AUTO: 1.1 % (ref 0–4)
ERYTHROCYTE [DISTWIDTH] IN BLOOD BY AUTOMATED COUNT: 14.6 % (ref 11.6–13.7)
GFR SERPL CREATININE-BSD FRML MDRD: 90 ML/MIN (ref 90–?)
GLUCOSE SERPL-MCNC: 94 MG/DL (ref 74–106)
HCT VFR BLD AUTO: 26.1 % (ref 36–52)
HGB BLD-MCNC: 9 G/DL (ref 12–18)
LYMPHOCYTES # BLD AUTO: 2.4 K/UL (ref 2–11.5)
LYMPHOCYTES NFR BLD AUTO: 24.2 % (ref 20.5–51.1)
MCH RBC QN AUTO: 34 PG (ref 27–31)
MCHC RBC AUTO-ENTMCNC: 34 G/DL (ref 33–37)
MCV RBC AUTO: 99.7 FL (ref 80–94)
MONOCYTES # BLD AUTO: 1.3 K/UL (ref 0.8–1)
MONOCYTES NFR BLD AUTO: 13 % (ref 1.7–9.3)
NEUTROPHILS # BLD AUTO: 6 K/UL (ref 1.8–7.7)
NEUTROPHILS NFR BLD AUTO: 60.4 % (ref 42.2–75.2)
PLATELET # BLD AUTO: 565 K/UL (ref 140–450)
POTASSIUM SERPL-SCNC: 4.4 MMOL/L (ref 3.5–5.1)
PROTHROMBIN TIME: 11.6 SECS (ref 10.8–13.4)
RBC # BLD AUTO: 2.62 MIL/UL (ref 4.2–6.1)
SODIUM SERPL-SCNC: 136 MMOL/L (ref 136–145)
WBC # BLD AUTO: 10 K/UL (ref 4.8–10.8)

## 2023-01-24 RX ADMIN — PANTOPRAZOLE SODIUM SCH MG: 40 TABLET, DELAYED RELEASE ORAL at 08:10

## 2023-01-24 RX ADMIN — ENOXAPARIN SODIUM SCH MG: 40 INJECTION SUBCUTANEOUS at 08:11

## 2023-01-24 RX ADMIN — Medication SCH EA: at 05:30

## 2023-01-24 RX ADMIN — MIDODRINE HYDROCHLORIDE SCH MG: 5 TABLET ORAL at 17:44

## 2023-01-24 RX ADMIN — ALBUTEROL SULFATE SCH MG: 2.5 SOLUTION RESPIRATORY (INHALATION) at 05:32

## 2023-01-24 RX ADMIN — VALPROIC ACID SCH MG: 250 SOLUTION ORAL at 08:09

## 2023-01-24 RX ADMIN — VANCOMYCIN HYDROCHLORIDE SCH MG: KIT at 11:33

## 2023-01-24 RX ADMIN — FUROSEMIDE SCH MG: 10 INJECTION, SOLUTION INTRAMUSCULAR; INTRAVENOUS at 08:53

## 2023-01-24 RX ADMIN — VANCOMYCIN HYDROCHLORIDE SCH MG: KIT at 05:28

## 2023-01-24 RX ADMIN — DEXTROSE SCH MLS/HR: 50 INJECTION, SOLUTION INTRAVENOUS at 17:43

## 2023-01-24 RX ADMIN — MIDODRINE HYDROCHLORIDE SCH MG: 5 TABLET ORAL at 11:32

## 2023-01-24 RX ADMIN — ALBUTEROL SULFATE SCH MG: 2.5 SOLUTION RESPIRATORY (INHALATION) at 19:29

## 2023-01-24 RX ADMIN — ALBUTEROL SULFATE SCH MG: 2.5 SOLUTION RESPIRATORY (INHALATION) at 08:30

## 2023-01-24 RX ADMIN — AMIODARONE HYDROCHLORIDE SCH MG: 200 TABLET ORAL at 08:09

## 2023-01-24 RX ADMIN — BENZTROPINE MESYLATE SCH MG: 1 TABLET ORAL at 08:10

## 2023-01-24 RX ADMIN — POTASSIUM CHLORIDE SCH MEQ: 40 SOLUTION ORAL at 08:08

## 2023-01-24 RX ADMIN — DEXTROSE SCH MLS/HR: 50 INJECTION, SOLUTION INTRAVENOUS at 12:15

## 2023-01-24 RX ADMIN — POTASSIUM CHLORIDE SCH MEQ: 40 SOLUTION ORAL at 08:22

## 2023-01-24 RX ADMIN — MIDODRINE HYDROCHLORIDE SCH MG: 5 TABLET ORAL at 05:29

## 2023-01-24 RX ADMIN — ALBUTEROL SULFATE SCH MG: 2.5 SOLUTION RESPIRATORY (INHALATION) at 14:42

## 2023-01-24 NOTE — NUR
RECEIVED PATIENT ON BED ALERT, NODS HEAD IN RESPONSE TO CALLS AND YES ANSWER. ORALLY 
INTUBATED AND VENTILATED AT 21% FI02, SO2 98%. CARDIACSCOPE SHOWS ON SINUS RHYTHM HR 72/MIN. 
ABDOMEN SOFT , ACTIVE BOWEL SOUNDS. ON CONTINUOUS TUBE FEEDING VIA OGT; TOLERATED. CASTILLO 
CATH IN PLACE TO GRAVITY DRAINAGE BAG; DRAINING TO CLEAR YELLOW URINE OUTPUT; PATENT AND 
INTACT.

## 2023-01-24 NOTE — NUR
PT WILL BE D/C TO [facility]. REPORT GIVEN TO ROBBY THORNTON VIA 
PHONE/[phone], ALL QUESTIONS ANSWERED.

## 2023-01-24 NOTE — NUR
DISCHARGE TO [facility] VIA [facility] IN FAIR CONDITION. REPORT GIVEN TO [facility] 
PERSONNEL.

## 2023-01-24 NOTE — NUR
RECEIVED BESIDE REPORT FROM NIGHT SHIFT ISAK RN. PT ETT TO VENT, SIMV VC FIO2 21%, , 
RR 10, PEEP 5. SR ON MONITOR. OGT CLAMPED, NPO AFTER MIDNIGHT DUE TO SCHEDULED SURGERY. PICC 
LINE TO TEJA, RUNNING NS @ 5 MLS/HR. CASTILLO IN PLACE TO GRAVITY. RECTAL TUBE IN PLACE. 
GENERALIZED WEAKNESS, BEDREST. BED TO LOWEST POSITION, CALL LIGHT WITHIN REACH, WILL 
CONTINUE TO MONITOR.